# Patient Record
Sex: FEMALE | Race: OTHER | HISPANIC OR LATINO | Employment: UNEMPLOYED | URBAN - NONMETROPOLITAN AREA
[De-identification: names, ages, dates, MRNs, and addresses within clinical notes are randomized per-mention and may not be internally consistent; named-entity substitution may affect disease eponyms.]

---

## 2019-06-19 ENCOUNTER — APPOINTMENT (EMERGENCY)
Dept: CT IMAGING | Facility: HOSPITAL | Age: 29
End: 2019-06-19
Payer: COMMERCIAL

## 2019-06-19 ENCOUNTER — APPOINTMENT (EMERGENCY)
Dept: ULTRASOUND IMAGING | Facility: HOSPITAL | Age: 29
End: 2019-06-19
Payer: COMMERCIAL

## 2019-06-19 ENCOUNTER — HOSPITAL ENCOUNTER (EMERGENCY)
Facility: HOSPITAL | Age: 29
Discharge: HOME | End: 2019-06-19
Attending: EMERGENCY MEDICINE
Payer: COMMERCIAL

## 2019-06-19 VITALS
TEMPERATURE: 99.9 F | SYSTOLIC BLOOD PRESSURE: 90 MMHG | DIASTOLIC BLOOD PRESSURE: 55 MMHG | HEIGHT: 65 IN | HEART RATE: 71 BPM | WEIGHT: 107.14 LBS | RESPIRATION RATE: 12 BRPM | OXYGEN SATURATION: 99 % | BODY MASS INDEX: 17.85 KG/M2

## 2019-06-19 DIAGNOSIS — R10.32 LLQ PAIN: ICD-10-CM

## 2019-06-19 DIAGNOSIS — N73.0 PID (ACUTE PELVIC INFLAMMATORY DISEASE): Primary | ICD-10-CM

## 2019-06-19 LAB
ALBUMIN SERPL BCP-MCNC: 3.9 G/DL (ref 3.5–5)
ALP SERPL-CCNC: 51 U/L (ref 46–116)
ALT SERPL W P-5'-P-CCNC: 20 U/L (ref 12–78)
ANION GAP SERPL CALCULATED.3IONS-SCNC: 5 MMOL/L (ref 4–13)
AST SERPL W P-5'-P-CCNC: 15 U/L (ref 5–45)
BACTERIA UR QL AUTO: ABNORMAL /HPF
BASOPHILS # BLD AUTO: 0.04 THOUSANDS/ΜL (ref 0–0.1)
BASOPHILS NFR BLD AUTO: 1 % (ref 0–1)
BILIRUB SERPL-MCNC: 0.5 MG/DL (ref 0.2–1)
BILIRUB UR QL STRIP: NEGATIVE
BUN SERPL-MCNC: 9 MG/DL (ref 5–25)
CALCIUM SERPL-MCNC: 9.2 MG/DL (ref 8.3–10.1)
CHLORIDE SERPL-SCNC: 104 MMOL/L (ref 100–108)
CLARITY UR: ABNORMAL
CO2 SERPL-SCNC: 29 MMOL/L (ref 21–32)
COLOR UR: YELLOW
CREAT SERPL-MCNC: 0.7 MG/DL (ref 0.6–1.3)
EOSINOPHIL # BLD AUTO: 0.1 THOUSAND/ΜL (ref 0–0.61)
EOSINOPHIL NFR BLD AUTO: 2 % (ref 0–6)
ERYTHROCYTE [DISTWIDTH] IN BLOOD BY AUTOMATED COUNT: 13.5 % (ref 11.6–15.1)
EXT PREG TEST URINE: NORMAL
EXT. CONTROL ED NAV: NORMAL
GFR SERPL CREATININE-BSD FRML MDRD: 119 ML/MIN/1.73SQ M
GLUCOSE SERPL-MCNC: 75 MG/DL (ref 65–140)
GLUCOSE UR STRIP-MCNC: NEGATIVE MG/DL
HCT VFR BLD AUTO: 40.3 % (ref 34.8–46.1)
HGB BLD-MCNC: 12.6 G/DL (ref 11.5–15.4)
HGB UR QL STRIP.AUTO: ABNORMAL
IMM GRANULOCYTES # BLD AUTO: 0.02 THOUSAND/UL (ref 0–0.2)
IMM GRANULOCYTES NFR BLD AUTO: 0 % (ref 0–2)
KETONES UR STRIP-MCNC: NEGATIVE MG/DL
LEUKOCYTE ESTERASE UR QL STRIP: ABNORMAL
LIPASE SERPL-CCNC: 155 U/L (ref 73–393)
LYMPHOCYTES # BLD AUTO: 2.05 THOUSANDS/ΜL (ref 0.6–4.47)
LYMPHOCYTES NFR BLD AUTO: 31 % (ref 14–44)
MCH RBC QN AUTO: 30.1 PG (ref 26.8–34.3)
MCHC RBC AUTO-ENTMCNC: 31.3 G/DL (ref 31.4–37.4)
MCV RBC AUTO: 96 FL (ref 82–98)
MONOCYTES # BLD AUTO: 0.55 THOUSAND/ΜL (ref 0.17–1.22)
MONOCYTES NFR BLD AUTO: 8 % (ref 4–12)
MUCOUS THREADS UR QL AUTO: ABNORMAL
NEUTROPHILS # BLD AUTO: 3.85 THOUSANDS/ΜL (ref 1.85–7.62)
NEUTS SEG NFR BLD AUTO: 58 % (ref 43–75)
NITRITE UR QL STRIP: NEGATIVE
NON-SQ EPI CELLS URNS QL MICRO: ABNORMAL /HPF
NRBC BLD AUTO-RTO: 0 /100 WBCS
PH UR STRIP.AUTO: 5 [PH]
PLATELET # BLD AUTO: 264 THOUSANDS/UL (ref 149–390)
PMV BLD AUTO: 10.8 FL (ref 8.9–12.7)
POTASSIUM SERPL-SCNC: 4.4 MMOL/L (ref 3.5–5.3)
PROT SERPL-MCNC: 7.6 G/DL (ref 6.4–8.2)
PROT UR STRIP-MCNC: NEGATIVE MG/DL
RBC # BLD AUTO: 4.19 MILLION/UL (ref 3.81–5.12)
RBC #/AREA URNS AUTO: ABNORMAL /HPF
SODIUM SERPL-SCNC: 138 MMOL/L (ref 136–145)
SP GR UR STRIP.AUTO: 1.02 (ref 1–1.03)
UROBILINOGEN UR QL STRIP.AUTO: 0.2 E.U./DL
WBC # BLD AUTO: 6.61 THOUSAND/UL (ref 4.31–10.16)
WBC #/AREA URNS AUTO: ABNORMAL /HPF

## 2019-06-19 PROCEDURE — 87491 CHLMYD TRACH DNA AMP PROBE: CPT | Performed by: EMERGENCY MEDICINE

## 2019-06-19 PROCEDURE — 99284 EMERGENCY DEPT VISIT MOD MDM: CPT | Performed by: EMERGENCY MEDICINE

## 2019-06-19 PROCEDURE — 96375 TX/PRO/DX INJ NEW DRUG ADDON: CPT

## 2019-06-19 PROCEDURE — 96361 HYDRATE IV INFUSION ADD-ON: CPT

## 2019-06-19 PROCEDURE — 83690 ASSAY OF LIPASE: CPT | Performed by: EMERGENCY MEDICINE

## 2019-06-19 PROCEDURE — 36415 COLL VENOUS BLD VENIPUNCTURE: CPT | Performed by: EMERGENCY MEDICINE

## 2019-06-19 PROCEDURE — 76830 TRANSVAGINAL US NON-OB: CPT

## 2019-06-19 PROCEDURE — 81025 URINE PREGNANCY TEST: CPT | Performed by: EMERGENCY MEDICINE

## 2019-06-19 PROCEDURE — 96365 THER/PROPH/DIAG IV INF INIT: CPT

## 2019-06-19 PROCEDURE — 74176 CT ABD & PELVIS W/O CONTRAST: CPT

## 2019-06-19 PROCEDURE — 76856 US EXAM PELVIC COMPLETE: CPT

## 2019-06-19 PROCEDURE — 81001 URINALYSIS AUTO W/SCOPE: CPT | Performed by: EMERGENCY MEDICINE

## 2019-06-19 PROCEDURE — 99284 EMERGENCY DEPT VISIT MOD MDM: CPT

## 2019-06-19 PROCEDURE — 80053 COMPREHEN METABOLIC PANEL: CPT | Performed by: EMERGENCY MEDICINE

## 2019-06-19 PROCEDURE — 87591 N.GONORRHOEAE DNA AMP PROB: CPT | Performed by: EMERGENCY MEDICINE

## 2019-06-19 PROCEDURE — 85025 COMPLETE CBC W/AUTO DIFF WBC: CPT | Performed by: EMERGENCY MEDICINE

## 2019-06-19 RX ORDER — KETOROLAC TROMETHAMINE 30 MG/ML
15 INJECTION, SOLUTION INTRAMUSCULAR; INTRAVENOUS ONCE
Status: COMPLETED | OUTPATIENT
Start: 2019-06-19 | End: 2019-06-19

## 2019-06-19 RX ORDER — DOXYCYCLINE 100 MG/1
100 TABLET ORAL 2 TIMES DAILY
Qty: 28 TABLET | Refills: 0 | Status: SHIPPED | OUTPATIENT
Start: 2019-06-19 | End: 2019-07-03

## 2019-06-19 RX ORDER — CITALOPRAM 20 MG/1
20 TABLET ORAL DAILY
COMMUNITY

## 2019-06-19 RX ORDER — DOXYCYCLINE HYCLATE 100 MG/1
100 CAPSULE ORAL ONCE
Status: COMPLETED | OUTPATIENT
Start: 2019-06-19 | End: 2019-06-19

## 2019-06-19 RX ADMIN — SODIUM CHLORIDE 1000 ML: 0.9 INJECTION, SOLUTION INTRAVENOUS at 14:45

## 2019-06-19 RX ADMIN — Medication 250 MG: at 19:51

## 2019-06-19 RX ADMIN — DOXYCYCLINE HYCLATE 100 MG: 100 CAPSULE ORAL at 19:27

## 2019-06-19 RX ADMIN — KETOROLAC TROMETHAMINE 15 MG: 30 INJECTION, SOLUTION INTRAMUSCULAR; INTRAVENOUS at 17:31

## 2019-06-20 LAB
C TRACH DNA SPEC QL NAA+PROBE: NEGATIVE
N GONORRHOEA DNA SPEC QL NAA+PROBE: NEGATIVE

## 2019-10-24 ENCOUNTER — HOSPITAL ENCOUNTER (EMERGENCY)
Facility: HOSPITAL | Age: 29
Discharge: HOME | End: 2019-10-24
Attending: EMERGENCY MEDICINE | Admitting: EMERGENCY MEDICINE
Payer: COMMERCIAL

## 2019-10-24 VITALS
TEMPERATURE: 98.7 F | HEART RATE: 76 BPM | SYSTOLIC BLOOD PRESSURE: 107 MMHG | WEIGHT: 105.82 LBS | BODY MASS INDEX: 17.61 KG/M2 | DIASTOLIC BLOOD PRESSURE: 67 MMHG | OXYGEN SATURATION: 99 % | RESPIRATION RATE: 18 BRPM

## 2019-10-24 DIAGNOSIS — Z32.01 POSITIVE PREGNANCY TEST: Primary | ICD-10-CM

## 2019-10-24 LAB
EXT PREG TEST URINE: POSITIVE
EXT. CONTROL ED NAV: ABNORMAL

## 2019-10-24 PROCEDURE — 99282 EMERGENCY DEPT VISIT SF MDM: CPT

## 2019-10-24 PROCEDURE — 81025 URINE PREGNANCY TEST: CPT

## 2019-10-24 PROCEDURE — 99281 EMR DPT VST MAYX REQ PHY/QHP: CPT | Performed by: PHYSICIAN ASSISTANT

## 2019-10-24 NOTE — ED PROVIDER NOTES
History  Chief Complaint   Patient presents with    Pregnancy Test     Patient had three at home positve pregnancy tests and wants to have a pregnancy test done  Patient denies any symptoms or discomfort  Patient presents to the emergency department today for essentially an known pregnancy  She states her last menstrual cycle was 5 weeks ago  She denies any abdominal pain  She denies nausea vomiting  She denies any vaginal bleeding or discharge  She states she took 3 home pregnancy tests and is here because she needs proof pregnancy for insurance purposes  She is now  5 para 3, last pregnancy was terminated with an elective   Prior to Admission Medications   Prescriptions Last Dose Informant Patient Reported? Taking?   citalopram (CeleXA) 20 mg tablet   Yes No   Sig: Take 20 mg by mouth daily      Facility-Administered Medications: None       Past Medical History:   Diagnosis Date    Bilateral ovarian cysts     Psychiatric disorder     depression       History reviewed  No pertinent surgical history  History reviewed  No pertinent family history  I have reviewed and agree with the history as documented  Social History     Tobacco Use    Smoking status: Current Some Day Smoker     Last attempt to quit: 2016     Years since quitting: 3 3    Smokeless tobacco: Never Used   Substance Use Topics    Alcohol use: Yes     Comment: socially    Drug use: Not Currently        Review of Systems   Constitutional: Negative  Negative for chills and fever  HENT: Negative  Negative for sore throat and trouble swallowing  Eyes: Negative  Respiratory: Negative  Negative for cough, shortness of breath and wheezing  Cardiovascular: Negative  Negative for chest pain and leg swelling  Gastrointestinal: Negative  Negative for abdominal pain, blood in stool and vomiting  Endocrine: Negative  Genitourinary: Negative  Musculoskeletal: Negative    Negative for neck stiffness  Skin: Negative  Allergic/Immunologic: Negative  Neurological: Negative  Negative for dizziness, seizures, speech difficulty, weakness, light-headedness, numbness and headaches  Hematological: Negative  Psychiatric/Behavioral: Negative  All other systems reviewed and are negative  Physical Exam  Physical Exam   Constitutional: She is oriented to person, place, and time  Vital signs are normal  She appears well-developed and well-nourished  She does not have a sickly appearance  She does not appear ill  No distress  HENT:   Right Ear: External ear normal  No swelling  Tympanic membrane is not bulging  Left Ear: External ear normal  No swelling  Tympanic membrane is not bulging  Nose: Nose normal    Mouth/Throat: Oropharynx is clear and moist  No oropharyngeal exudate  Eyes: Pupils are equal, round, and reactive to light  Conjunctivae, EOM and lids are normal    Neck: Normal range of motion  Neck supple  No JVD present  No tracheal deviation, no edema and normal range of motion present  No thyromegaly present  Cardiovascular: Normal rate, regular rhythm, normal heart sounds, intact distal pulses and normal pulses  Exam reveals no gallop and no friction rub  No murmur heard  Pulmonary/Chest: Effort normal and breath sounds normal  No stridor  No respiratory distress  She has no wheezes  She has no rales  She exhibits no tenderness  Abdominal: Soft  Bowel sounds are normal  She exhibits no distension and no mass  There is no tenderness  There is no rebound, no guarding and negative Curran's sign  No hernia  Musculoskeletal: Normal range of motion  She exhibits no edema or tenderness  Lymphadenopathy:     She has no cervical adenopathy  Neurological: She is alert and oriented to person, place, and time  She has normal strength and normal reflexes  No cranial nerve deficit or sensory deficit  GCS eye subscore is 4  GCS verbal subscore is 5  GCS motor subscore is 6  Skin: Skin is warm and dry  Capillary refill takes less than 2 seconds  No rash noted  She is not diaphoretic  No erythema  No pallor  Psychiatric: She has a normal mood and affect  Her speech is normal and behavior is normal    Vitals reviewed  Vital Signs  ED Triage Vitals [10/24/19 1645]   Temperature Pulse Respirations Blood Pressure SpO2   98 7 °F (37 1 °C) 76 18 107/67 99 %      Temp Source Heart Rate Source Patient Position - Orthostatic VS BP Location FiO2 (%)   Temporal Monitor Sitting Left arm --      Pain Score       No Pain           Vitals:    10/24/19 1645   BP: 107/67   Pulse: 76   Patient Position - Orthostatic VS: Sitting         Visual Acuity      ED Medications  Medications - No data to display    Diagnostic Studies  Results Reviewed     Procedure Component Value Units Date/Time    POCT pregnancy, urine [[id number]]  (Abnormal) Resulted:  10/24/19 1651    Lab Status:  Final result Updated:  10/24/19 1651     EXT PREG TEST UR (Ref: Negative) Positive     Control Valid                 No orders to display              Procedures  Procedures       ED Course  ED Course as of Oct 24 1713   u Oct 24, 2019   1658 Control: Valid   1658 PREGNANCY TEST URINE: Positive   1658 Blood Pressure: 107/67   1658 Temperature: 98 7 °F (37 1 °C)   1658 Pulse: 76   1658 Respirations: 18   1658 SpO2: 99 %                               MDM    Disposition  Final diagnoses:   Positive pregnancy test     Time reflects when diagnosis was documented in both MDM as applicable and the Disposition within this note     Time User Action Codes Description Comment    10/24/2019  4:58 PM Pilar ROSE Add [Z32 01] Positive pregnancy test       ED Disposition     ED Disposition Condition Date/Time Comment    Discharge Stable u Oct 24, 2019  4:58 PM Mc Dyer discharge to home/self care              Follow-up Information     Follow up With Specialties Details Why Contact Info    OBGYN  Schedule an appointment as soon as possible for a visit             Patient's Medications   Discharge Prescriptions    No medications on file     No discharge procedures on file      ED Provider  Electronically Signed by           Rodrick Boast, PA-C  10/24/19 100 [facility] Amna Oseguera PA-C  10/24/19 7779

## 2019-10-30 ENCOUNTER — HOSPITAL ENCOUNTER (EMERGENCY)
Facility: HOSPITAL | Age: 29
Discharge: HOME | End: 2019-10-30
Attending: EMERGENCY MEDICINE | Admitting: EMERGENCY MEDICINE
Payer: COMMERCIAL

## 2019-10-30 ENCOUNTER — APPOINTMENT (EMERGENCY)
Dept: ULTRASOUND IMAGING | Facility: HOSPITAL | Age: 29
End: 2019-10-30
Payer: COMMERCIAL

## 2019-10-30 VITALS
SYSTOLIC BLOOD PRESSURE: 99 MMHG | TEMPERATURE: 98 F | RESPIRATION RATE: 16 BRPM | DIASTOLIC BLOOD PRESSURE: 55 MMHG | WEIGHT: 108.03 LBS | OXYGEN SATURATION: 99 % | BODY MASS INDEX: 18.44 KG/M2 | HEART RATE: 86 BPM | HEIGHT: 64 IN

## 2019-10-30 DIAGNOSIS — O20.0 THREATENED MISCARRIAGE IN EARLY PREGNANCY: Primary | ICD-10-CM

## 2019-10-30 DIAGNOSIS — N39.0 LOWER URINARY TRACT INFECTION: ICD-10-CM

## 2019-10-30 DIAGNOSIS — O20.9 VAGINAL BLEEDING IN PREGNANCY, FIRST TRIMESTER: ICD-10-CM

## 2019-10-30 LAB
ABO GROUP BLD: NORMAL
ANION GAP SERPL CALCULATED.3IONS-SCNC: 7 MMOL/L (ref 4–13)
B-HCG SERPL-ACNC: 7637 MIU/ML
BACTERIA UR QL AUTO: ABNORMAL /HPF
BASOPHILS # BLD AUTO: 0.05 THOUSANDS/ΜL (ref 0–0.1)
BASOPHILS NFR BLD AUTO: 1 % (ref 0–1)
BILIRUB UR QL STRIP: NEGATIVE
BLD GP AB SCN SERPL QL: NEGATIVE
BUN SERPL-MCNC: 12 MG/DL (ref 5–25)
CALCIUM SERPL-MCNC: 9 MG/DL (ref 8.3–10.1)
CHLORIDE SERPL-SCNC: 101 MMOL/L (ref 100–108)
CLARITY UR: CLEAR
CO2 SERPL-SCNC: 27 MMOL/L (ref 21–32)
COLOR UR: YELLOW
CREAT SERPL-MCNC: 0.69 MG/DL (ref 0.6–1.3)
EOSINOPHIL # BLD AUTO: 0.08 THOUSAND/ΜL (ref 0–0.61)
EOSINOPHIL NFR BLD AUTO: 1 % (ref 0–6)
ERYTHROCYTE [DISTWIDTH] IN BLOOD BY AUTOMATED COUNT: 13.5 % (ref 11.6–15.1)
GFR SERPL CREATININE-BSD FRML MDRD: 119 ML/MIN/1.73SQ M
GLUCOSE SERPL-MCNC: 75 MG/DL (ref 65–140)
GLUCOSE UR STRIP-MCNC: NEGATIVE MG/DL
HCT VFR BLD AUTO: 39.9 % (ref 34.8–46.1)
HGB BLD-MCNC: 12.6 G/DL (ref 11.5–15.4)
HGB UR QL STRIP.AUTO: ABNORMAL
IMM GRANULOCYTES # BLD AUTO: 0.03 THOUSAND/UL (ref 0–0.2)
IMM GRANULOCYTES NFR BLD AUTO: 0 % (ref 0–2)
KETONES UR STRIP-MCNC: ABNORMAL MG/DL
LEUKOCYTE ESTERASE UR QL STRIP: ABNORMAL
LYMPHOCYTES # BLD AUTO: 2.1 THOUSANDS/ΜL (ref 0.6–4.47)
LYMPHOCYTES NFR BLD AUTO: 24 % (ref 14–44)
MCH RBC QN AUTO: 30 PG (ref 26.8–34.3)
MCHC RBC AUTO-ENTMCNC: 31.6 G/DL (ref 31.4–37.4)
MCV RBC AUTO: 95 FL (ref 82–98)
MONOCYTES # BLD AUTO: 0.51 THOUSAND/ΜL (ref 0.17–1.22)
MONOCYTES NFR BLD AUTO: 6 % (ref 4–12)
NEUTROPHILS # BLD AUTO: 6.11 THOUSANDS/ΜL (ref 1.85–7.62)
NEUTS SEG NFR BLD AUTO: 68 % (ref 43–75)
NITRITE UR QL STRIP: POSITIVE
NON-SQ EPI CELLS URNS QL MICRO: ABNORMAL /HPF
NRBC BLD AUTO-RTO: 0 /100 WBCS
PH UR STRIP.AUTO: 6 [PH]
PLATELET # BLD AUTO: 315 THOUSANDS/UL (ref 149–390)
PMV BLD AUTO: 10.5 FL (ref 8.9–12.7)
POTASSIUM SERPL-SCNC: 3.5 MMOL/L (ref 3.5–5.3)
PROT UR STRIP-MCNC: NEGATIVE MG/DL
RBC # BLD AUTO: 4.2 MILLION/UL (ref 3.81–5.12)
RBC #/AREA URNS AUTO: ABNORMAL /HPF
RH BLD: POSITIVE
SODIUM SERPL-SCNC: 135 MMOL/L (ref 136–145)
SP GR UR STRIP.AUTO: >=1.03 (ref 1–1.03)
SPECIMEN EXPIRATION DATE: NORMAL
UROBILINOGEN UR QL STRIP.AUTO: 0.2 E.U./DL
WBC # BLD AUTO: 8.88 THOUSAND/UL (ref 4.31–10.16)
WBC #/AREA URNS AUTO: ABNORMAL /HPF

## 2019-10-30 PROCEDURE — 84702 CHORIONIC GONADOTROPIN TEST: CPT | Performed by: EMERGENCY MEDICINE

## 2019-10-30 PROCEDURE — 87086 URINE CULTURE/COLONY COUNT: CPT | Performed by: EMERGENCY MEDICINE

## 2019-10-30 PROCEDURE — 99284 EMERGENCY DEPT VISIT MOD MDM: CPT | Performed by: EMERGENCY MEDICINE

## 2019-10-30 PROCEDURE — 87186 SC STD MICRODIL/AGAR DIL: CPT | Performed by: EMERGENCY MEDICINE

## 2019-10-30 PROCEDURE — 36415 COLL VENOUS BLD VENIPUNCTURE: CPT | Performed by: EMERGENCY MEDICINE

## 2019-10-30 PROCEDURE — 99285 EMERGENCY DEPT VISIT HI MDM: CPT

## 2019-10-30 PROCEDURE — 76801 OB US < 14 WKS SINGLE FETUS: CPT

## 2019-10-30 PROCEDURE — 85025 COMPLETE CBC W/AUTO DIFF WBC: CPT | Performed by: EMERGENCY MEDICINE

## 2019-10-30 PROCEDURE — 86850 RBC ANTIBODY SCREEN: CPT | Performed by: EMERGENCY MEDICINE

## 2019-10-30 PROCEDURE — 87077 CULTURE AEROBIC IDENTIFY: CPT | Performed by: EMERGENCY MEDICINE

## 2019-10-30 PROCEDURE — 81001 URINALYSIS AUTO W/SCOPE: CPT | Performed by: EMERGENCY MEDICINE

## 2019-10-30 PROCEDURE — 86901 BLOOD TYPING SEROLOGIC RH(D): CPT | Performed by: EMERGENCY MEDICINE

## 2019-10-30 PROCEDURE — 86900 BLOOD TYPING SEROLOGIC ABO: CPT | Performed by: EMERGENCY MEDICINE

## 2019-10-30 PROCEDURE — 80048 BASIC METABOLIC PNL TOTAL CA: CPT | Performed by: EMERGENCY MEDICINE

## 2019-10-30 RX ORDER — CEPHALEXIN 500 MG/1
500 CAPSULE ORAL EVERY 6 HOURS SCHEDULED
Qty: 21 CAPSULE | Refills: 0 | Status: SHIPPED | OUTPATIENT
Start: 2019-10-30 | End: 2019-11-06

## 2019-10-30 RX ORDER — ACETAMINOPHEN 325 MG/1
975 TABLET ORAL ONCE
Status: COMPLETED | OUTPATIENT
Start: 2019-10-30 | End: 2019-10-30

## 2019-10-30 RX ORDER — CEPHALEXIN 250 MG/1
500 CAPSULE ORAL ONCE
Status: COMPLETED | OUTPATIENT
Start: 2019-10-30 | End: 2019-10-30

## 2019-10-30 RX ADMIN — CEPHALEXIN 500 MG: 250 CAPSULE ORAL at 17:53

## 2019-10-30 RX ADMIN — ACETAMINOPHEN 975 MG: 325 TABLET, FILM COATED ORAL at 17:52

## 2019-10-30 NOTE — ED CARE HANDOFF
Emergency Department Sign Out Note        Sign out and transfer of care from Dr Harsha Wills  See Separate Emergency Department note  The patient, Ignacia Hernandez, was evaluated by the previous provider for dysuria, as well as vaginal spotting in setting of current 1st trimester pregnancy  Last menstrual period is 09/19/2019  Workup Completed:  Basic labs including HCG and type screen which reveal no anemia, essentially unremarkable BMP, hCG of 7637  At this time, patient is awaiting a transabdominal/transvaginal ultrasound to assess for IUP and rule out ectopic pregnancy  ED Course / Workup Pending (followup): I evaluated the patient  Patient is receiving Tylenol for low back pain  I performed a pelvic exam which revealed unremarkable external female genitalia  There is a small amount of nonmalodorous yellow discharge with streaks of red  Cervix is multiparous, closed, with a strand of mucus  Sample for [facility] Chlamydia sent  Patient underwent a transabdominal/transvaginal ultrasound  Ultrasound reveals a possible 6 mm gestational sac within the fundus, however, differential diagnosis still includes ectopic  Recommendation for serial beta-hCGs  Discussed the case with Dr Loretta Rose in [place] who recommends repeat beta HCG in 2 days, repeat ultrasound in 1 week, establishing care with OBGYN for current pregnancy, and discharging the patient with strict return precautions  Patient is Rh positive, no indication for RhoGAM   I discussed lab and imaging results with the patient  Patient received 1st dose of cephalexin in the emergency department  Prescription for cephalexin sent by my colleague Dr Harsha Wills to the patient's preferred pharmacy  Patient provided with contact information for Dr Erickson Gip  She is to return to ER if she develops worsening abdominal pain, significant vaginal bleeding, or she feels as though she is about to pass out    Patient and mother expressed understanding of and agreement with this plan  ED Course as of Oct 30 2006   Wed Oct 30, 2019   1934 Ultrasound       3 Spoke to Dr Juan Carlos Wang in [place] given differential on ultrasound, patient will need a repeat beta Hcg in 2 days and a repeat ultrasound in 1 week  US OB < 14 weeks with transvaginal     US OB < 14 weeks with transvaginal   Final Result   Possible 6 mm gestational sac within the fundal endometrium, too small for measurements  Differential remains early IUP, spontaneous  and ectopic pregnancy  Correlate with serial quantitative BHCG  The study was marked in EPIC for significant notification  Workstation performed: [id number]           Procedures  MDM    Disposition  Final diagnoses:   Threatened miscarriage in early pregnancy   Lower urinary tract infection     Time reflects when diagnosis was documented in both MDM as applicable and the Disposition within this note     Time User Action Codes Description Comment    10/30/2019  5:10 PM Soo Broussard Add [O20 0] Threatened miscarriage in early pregnancy     10/30/2019  5:10 PM Soo Broussard Add [N39 0] Lower urinary tract infection     10/30/2019  5:22 PM Luis Felipe Montero Add [O20 9] Vaginal bleeding in pregnancy, first trimester     10/30/2019  7:47 PM Jaiden Kumar Modify [O20 9] Vaginal bleeding in pregnancy, first trimester       ED Disposition     ED Disposition Condition Date/Time Comment    Discharge Stable Wed Oct 30, 2019  8:15 PM Patsy Schmitt discharge to home/self care              Follow-up Information     Follow up With Specialties Details Why Contact Info    Rena Leiva MD Obstetrics and Gynecology, Gynecology, Obstetrics In 2 days ER Follow up  [facility] [address]  [phone]          Patient's Medications   Discharge Prescriptions    CEPHALEXIN (KEFLEX) 500 MG CAPSULE    Take 1 capsule (500 mg total) by mouth every 6 (six) hours for 7 days       Start Date: 10/30/2019End Date: 2019       Order Dose: 500 mg       Quantity: 21 capsule    Refills: 0     No discharge procedures on file         ED Provider  Electronically Signed by     Rosa Lacey MD  10/31/19 0393

## 2019-10-30 NOTE — DISCHARGE INSTRUCTIONS
Start taking cephalexin (Keflex) for your urinary tract infection  You need a repeat beta hCG (lab draw) in 2 days, on Friday, November 1st   Your beta HCG today 5519 (normal in 1st trimester pregnancy)  See an OBGYN to have this work up  You will need a repeat ultrasound in 1 week to make sure your pregnancy is developing appropriately  While you are waiting to be seen by the physician, if you develop worsening bleeding, lightheadedness, or severe abdominal pain, seek medical attention at the nearest emergency room

## 2019-10-30 NOTE — ED PROVIDER NOTES
History  Chief Complaint   Patient presents with    Vaginal Bleeding - Pregnant     Pt reports vaginal bleeding and pain " in my ovaries" Pt states she is approximately 3 wks pregnant  [id number] woman LMP 19 Sept 2019 with positive home and ED UPT presents with vaginal spotting and passage of small clots at approx 1300 today while at home a/w lower cramping abd pain  Seen in this ED on 24 Oct after 3 positive home UPT and had confirmatory ED UPT; at that time did not have abd pain/gu bleeding  Was discharged home and had no interval complaints until this afternoon  Has not had any additional vaginal bleeding while in ED but has ongoing cramping suprapubic/llq abd pain that is notably improved with urination  Reports urinary frequency without dysuria/urgency over past 4-5d with sx typical of previous UTI according to patient  No GI bleeding; prior elective surgical termination of pregnancy in 2018 which was most recent pregnancy  No abx use in past 30d  Did not take or use anything for sx  Has small amount of clear vaginal discharge without itching/burning also over past 4-5d; no prior hx of STI that patient can recall--screened for GC/chlamydia in June 2019 which were negative  Otherwise no f/c/n/v/abd distention/hematemsis/hemoptysis/epistaxis  No use of assisted reproductive technology in this pregnancy  A/p: Early pregnancy bleeding with possible concurrent urinary/ infectious etiology  CBC/bmp/ua/quantiative hcg/gc+chlamydia via urine  Pelvic US  History provided by:  Patient and medical records      Prior to Admission Medications   Prescriptions Last Dose Informant Patient Reported? Taking?   citalopram (CeleXA) 20 mg tablet   Yes Yes   Sig: Take 20 mg by mouth daily      Facility-Administered Medications: None       Past Medical History:   Diagnosis Date    Bilateral ovarian cysts     Psychiatric disorder     depression       History reviewed  No pertinent surgical history  History reviewed  No pertinent family history  I have reviewed and agree with the history as documented  Social History     Tobacco Use    Smoking status: Former Smoker     Types: Cigarettes     Last attempt to quit: 6/19/2016     Years since quitting: 3 3    Smokeless tobacco: Never Used   Substance Use Topics    Alcohol use: Yes     Comment: socially    Drug use: Not Currently        Review of Systems   Constitutional: Negative for chills and fever  Respiratory: Negative for cough and shortness of breath  Cardiovascular: Negative for chest pain and palpitations  Gastrointestinal: Positive for abdominal pain  Negative for blood in stool, diarrhea, nausea and vomiting  Genitourinary: Positive for frequency, hematuria, pelvic pain, vaginal bleeding and vaginal discharge  Negative for difficulty urinating, flank pain and vaginal pain  Skin: Negative for color change, pallor, rash and wound  Neurological: Negative for weakness and numbness  Hematological: Negative for adenopathy  Does not bruise/bleed easily  All other systems reviewed and are negative  Physical Exam  Physical Exam   Constitutional: She is oriented to person, place, and time  She appears well-developed and well-nourished  She is cooperative  No distress  HENT:   Head: Normocephalic and atraumatic  Right Ear: Hearing and external ear normal    Left Ear: Hearing and external ear normal    Nose: Nose normal    Neck: Trachea normal, normal range of motion and phonation normal  Neck supple  No JVD present  No tracheal tenderness, no spinous process tenderness and no muscular tenderness present  No tracheal deviation present  No thyroid mass and no thyromegaly present  Cardiovascular: Normal rate, regular rhythm, S1 normal, S2 normal, normal heart sounds and intact distal pulses  Exam reveals no gallop and no friction rub  No murmur heard  Pulses:       Radial pulses are 2+ on the right side, and 2+ on the left side          Dorsalis pedis pulses are 2+ on the right side, and 2+ on the left side  Posterior tibial pulses are 2+ on the right side, and 2+ on the left side  Pulmonary/Chest: Effort normal and breath sounds normal  No stridor  No respiratory distress  She has no decreased breath sounds  She has no wheezes  She has no rhonchi  She has no rales  She exhibits no tenderness  Abdominal: Soft  She exhibits no distension and no mass  There is tenderness in the suprapubic area and left lower quadrant  There is no rigidity, no rebound, no guarding and no CVA tenderness  Musculoskeletal: Normal range of motion  She exhibits no edema, tenderness or deformity  Neurological: She is alert and oriented to person, place, and time  GCS eye subscore is 4  GCS verbal subscore is 5  GCS motor subscore is 6  Skin: Skin is warm, dry and intact  No rash noted  She is not diaphoretic  No erythema  Nursing note and vitals reviewed        Vital Signs  ED Triage Vitals [10/30/19 1618]   Temperature Pulse Respirations Blood Pressure SpO2   98 °F (36 7 °C) 84 17 102/51 100 %      Temp Source Heart Rate Source Patient Position - Orthostatic VS BP Location FiO2 (%)   Temporal Monitor Sitting Right arm --      Pain Score       Worst Possible Pain           Vitals:    10/30/19 1618 10/30/19 1915 10/30/19 2000   BP: 102/51 92/57 99/55   Pulse: 84 77 86   Patient Position - Orthostatic VS: Sitting Lying Lying         Visual Acuity      ED Medications  Medications   cephalexin (KEFLEX) capsule 500 mg (500 mg Oral Given 10/30/19 1753)   acetaminophen (TYLENOL) tablet 975 mg (975 mg Oral Given 10/30/19 1752)       Diagnostic Studies  Results Reviewed     Procedure Component Value Units Date/Time    Urine culture [[id number]]  (Abnormal) Collected:  10/30/19 1649    Lab Status:  Preliminary result Specimen:  Urine, Clean Catch Updated:  10/31/19 1806     Urine Culture >100,000 cfu/ml Non lactose fermenting gram negative pablo    Chlamydia/GC amplified DNA by PCR [[id number]] Collected:  10/30/19 1823    Lab Status:   In process Specimen:  Vaginal Updated:  10/30/19 0214    Basic metabolic panel [[id number]]  (Abnormal) Collected:  10/30/19 1651    Lab Status:  Final result Specimen:  Blood from Arm, Right Updated:  10/30/19 1733     Sodium 135 mmol/L      Potassium 3 5 mmol/L      Chloride 101 mmol/L      CO2 27 mmol/L      ANION GAP 7 mmol/L      BUN 12 mg/dL      Creatinine 0 69 mg/dL      Glucose 75 mg/dL      Calcium 9 0 mg/dL      eGFR 119 ml/min/1 73sq m     Narrative:       [place] guidelines for Chronic Kidney Disease (CKD):     Stage 1 with normal or high GFR (GFR > 90 mL/min/1 73 square meters)    Stage 2 Mild CKD (GFR = 60-89 mL/min/1 73 square meters)    Stage 3A Moderate CKD (GFR = 45-59 mL/min/1 73 square meters)    Stage 3B Moderate CKD (GFR = 30-44 mL/min/1 73 square meters)    Stage 4 Severe CKD (GFR = 15-29 mL/min/1 73 square meters)    Stage 5 End Stage CKD (GFR <15 mL/min/1 73 square meters)  Note: GFR calculation is accurate only with a steady state creatinine    hCG, quantitative [[id number]]  (Abnormal) Collected:  10/30/19 1651    Lab Status:  Final result Specimen:  Blood from Arm, Right Updated:  10/30/19 1733     HCG, Quant 9,884 mIU/mL     Narrative:        Expected Ranges:     Approximate               Approximate HCG  Gestation age          Concentration ( mIU/mL)  _____________          ______________________   Juan Luis [facility]                      HCG values  0 2-1                       5-50  1-2                           2-3                         100-5000  3-4                         500-53947  4-5                         1000-52104  5-6                         [id number]  6-8                         18297-213340  8-12                        28944-071728      Urine Microscopic [[id number]]  (Abnormal) Collected:  10/30/19 1649    Lab Status:  Final result Specimen:  Urine, Clean Catch Updated:  10/30/19 2163 RBC, UA 1-2 /hpf      WBC, UA 4-10 /hpf      Epithelial Cells Moderate /hpf      Bacteria, UA Innumerable /hpf      URINE COMMENT --    UA w Reflex to Microscopic w Reflex to Culture [[id number]]  (Abnormal) Collected:  10/30/19 1649    Lab Status:  Final result Specimen:  Urine, Clean Catch Updated:  10/30/19 1705     Color, UA Yellow     Clarity, UA Clear     Specific Gravity, UA >=1 030     pH, UA 6 0     Leukocytes, UA Small     Nitrite, UA Positive     Protein, UA Negative mg/dl      Glucose, UA Negative mg/dl      Ketones, UA Trace mg/dl      Urobilinogen, UA 0 2 E U /dl      Bilirubin, UA Negative     Blood, UA Small     URINE COMMENT --    CBC and differential [[id number]] Collected:  10/30/19 1652    Lab Status:  Final result Specimen:  Blood from Arm, Right Updated:  10/30/19 1657     WBC 8 88 Thousand/uL      RBC 4 20 Million/uL      Hemoglobin 12 6 g/dL      Hematocrit 39 9 %      MCV 95 fL      MCH 30 0 pg      MCHC 31 6 g/dL      RDW 13 5 %      MPV 10 5 fL      Platelets 268 Thousands/uL      nRBC 0 /100 WBCs      Neutrophils Relative 68 %      Immat GRANS % 0 %      Lymphocytes Relative 24 %      Monocytes Relative 6 %      Eosinophils Relative 1 %      Basophils Relative 1 %      Neutrophils Absolute 6 11 Thousands/µL      Immature Grans Absolute 0 03 Thousand/uL      Lymphocytes Absolute 2 10 Thousands/µL      Monocytes Absolute 0 51 Thousand/µL      Eosinophils Absolute 0 08 Thousand/µL      Basophils Absolute 0 05 Thousands/µL                  US OB < 14 weeks with transvaginal   Final Result by Linda Franco MD (10/30 1947)   Possible 6 mm gestational sac within the fundal endometrium, too small for measurements  Differential remains early IUP, spontaneous  and ectopic pregnancy  Correlate with serial quantitative BHCG  The study was marked in EPIC for significant notification              Workstation performed: [id number]                    Procedures  Procedures       ED Course  ED Course as of Oct 31 2104   Wed Oct 30, 2019   1707 WBC wnl  Hg/Hcg wnl  Plt wnl   UA: concentrated sample with positive leukocyte/nitrite and small blood  In setting of patient's sx, this is c/w urinary infection and she will be treated for this in addition to further assessment of threatened miscarriage  MDM  Number of Diagnoses or Management Options  Lower urinary tract infection: new and requires workup  Threatened miscarriage in early pregnancy: new and requires workup     Amount and/or Complexity of Data Reviewed  Clinical lab tests: ordered and reviewed  Tests in the radiology section of CPT®: ordered  Decide to obtain previous medical records or to obtain history from someone other than the patient: yes  Obtain history from someone other than the patient: yes  Review and summarize past medical records: yes  Independent visualization of images, tracings, or specimens: yes    Risk of Complications, Morbidity, and/or Mortality  Presenting problems: high  Diagnostic procedures: high  Management options: moderate  General comments: Care transferred to Dr Eliane Closs at 9660  Patient case discussed including hx/exam features and diagnostic workup  Pending pelvic US results; would treat for UTI in addition to management appropriate to results of pelvic US  Keflex already prescribed  Will need Ob f/u      Patient Progress  Patient progress: stable      Disposition  Final diagnoses:   Threatened miscarriage in early pregnancy   Lower urinary tract infection     Time reflects when diagnosis was documented in both MDM as applicable and the Disposition within this note     Time User Action Codes Description Comment    10/30/2019  5:10 PM Letta Siemens Add [O20 0] Threatened miscarriage in early pregnancy     10/30/2019  5:10 PM Letta Siemens Add [N39 0] Lower urinary tract infection     10/30/2019  5:22 PM Milind Lino Add [O20 9] Vaginal bleeding in pregnancy, first trimester 10/30/2019  7:47 PM Dubose Dinh Modify [O20 9] Vaginal bleeding in pregnancy, first trimester     10/31/2019  1:55 AM Linh Johnny Modify [O20 9] Vaginal bleeding in pregnancy, first trimester       ED Disposition     ED Disposition Condition Date/Time Comment    Discharge Stable Wed Oct 30, 2019  8:15 PM James Ends discharge to home/self care  Follow-up Information     Follow up With Specialties Details Why Naomi Johnson MD Obstetrics and Gynecology, Gynecology, Obstetrics In 2 days ER Follow up 2018 [address], [place] [address]  [phone]            Discharge Medication List as of 10/30/2019  8:22 PM      START taking these medications    Details   cephalexin (KEFLEX) 500 mg capsule Take 1 capsule (500 mg total) by mouth every 6 (six) hours for 7 days, Starting Wed 10/30/2019, Until Wed 11/6/2019, Normal         CONTINUE these medications which have NOT CHANGED    Details   citalopram (CeleXA) 20 mg tablet Take 20 mg by mouth daily, Historical Med           Outpatient Discharge Orders   hCG, quantitative   Standing Status: Future Standing Exp   Date: 11/30/19       ED Provider  Electronically Signed by           Earle Wells DO  10/31/19 4953

## 2019-10-31 ENCOUNTER — PATIENT OUTREACH (OUTPATIENT)
Dept: CASE MANAGEMENT | Facility: OTHER | Age: 29
End: 2019-10-31

## 2019-11-01 LAB — BACTERIA UR CULT: ABNORMAL

## 2020-01-02 ENCOUNTER — HOSPITAL ENCOUNTER (EMERGENCY)
Facility: HOSPITAL | Age: 30
Discharge: HOME | End: 2020-01-02
Attending: EMERGENCY MEDICINE | Admitting: EMERGENCY MEDICINE
Payer: COMMERCIAL

## 2020-01-02 VITALS
OXYGEN SATURATION: 100 % | TEMPERATURE: 98.7 F | WEIGHT: 113.1 LBS | HEIGHT: 65 IN | RESPIRATION RATE: 18 BRPM | HEART RATE: 72 BPM | SYSTOLIC BLOOD PRESSURE: 104 MMHG | DIASTOLIC BLOOD PRESSURE: 67 MMHG | BODY MASS INDEX: 18.84 KG/M2

## 2020-01-02 DIAGNOSIS — Z3A.12 12 WEEKS GESTATION OF PREGNANCY: ICD-10-CM

## 2020-01-02 DIAGNOSIS — G43.909 MIGRAINE: Primary | ICD-10-CM

## 2020-01-02 PROCEDURE — 99283 EMERGENCY DEPT VISIT LOW MDM: CPT

## 2020-01-02 PROCEDURE — 99284 EMERGENCY DEPT VISIT MOD MDM: CPT | Performed by: EMERGENCY MEDICINE

## 2020-01-02 RX ORDER — ACETAMINOPHEN 325 MG/1
650 TABLET ORAL ONCE
Status: COMPLETED | OUTPATIENT
Start: 2020-01-02 | End: 2020-01-02

## 2020-01-02 RX ADMIN — ACETAMINOPHEN 650 MG: 325 TABLET, FILM COATED ORAL at 21:14

## 2020-01-03 NOTE — ED PROVIDER NOTES
History  Chief Complaint   Patient presents with    Headache     pt has headache and neck pain for the past week  pt also has nausea      23-year-old female  3 para 2 , currently pregnant at 3 months gestation with a history of chronic migraines presents complaining of migraine headache  She states this migraine headache is typical for  She states she did not take anything today because she was concerned because she is pregnant  I did have extensive conversation with her explain to her that Tylenol is acceptable during pregnancy and she would not be able to take naproxen or Motrin  Patient voiced understanding  Patient does not have nuchal rigidity  She has no signs or symptoms that her abnormal for her typical migraines  She did not wish to have any further testing  Her headache is in the usual distribution which begins in her neck and wraps around like a hat band to the frontal forehead  Patient refused CAT scan IV fluids or other interventions      History provided by:  Patient  Headache   Quality:  Dull  Radiates to:  Does not radiate  Severity currently:  710  Severity at highest:  7/10  Onset quality:  Gradual  Duration:  1 day  Timing:  Intermittent  Context: activity and bright light    Relieved by:  Resting in a darkened room  Worsened by: Activity  Ineffective treatments:  None tried  Associated symptoms: no abdominal pain, no back pain, no blurred vision, no congestion, no cough, no diarrhea, no dizziness, no drainage and no ear pain    Risk factors: no anger and no family hx of SAH        Prior to Admission Medications   Prescriptions Last Dose Informant Patient Reported? Taking?   citalopram (CeleXA) 20 mg tablet   Yes No   Sig: Take 20 mg by mouth daily      Facility-Administered Medications: None       Past Medical History:   Diagnosis Date    Bilateral ovarian cysts     Psychiatric disorder     depression       History reviewed  No pertinent surgical history      History reviewed  No pertinent family history  I have reviewed and agree with the history as documented  Social History     Tobacco Use    Smoking status: Former Smoker     Types: Cigarettes     Last attempt to quit: 6/19/2016     Years since quitting: 3 5    Smokeless tobacco: Never Used   Substance Use Topics    Alcohol use: Yes     Comment: socially    Drug use: Not Currently        Review of Systems   Constitutional: Negative  HENT: Negative for congestion, ear pain and postnasal drip  Eyes: Negative  Negative for blurred vision  Respiratory: Negative for cough  Gastrointestinal: Negative for abdominal pain and diarrhea  Endocrine: Negative  Genitourinary: Negative  Musculoskeletal: Negative for back pain  Skin: Negative  Allergic/Immunologic: Negative  Neurological: Positive for headaches  Negative for dizziness  Hematological: Negative  Psychiatric/Behavioral: Negative  All other systems reviewed and are negative  Physical Exam  Physical Exam   Constitutional: She appears well-developed and well-nourished  HENT:   Head: Normocephalic  Right Ear: External ear normal    Left Ear: External ear normal    Mouth/Throat: Oropharynx is clear and moist    Eyes: Pupils are equal, round, and reactive to light  Right eye exhibits no discharge  Left eye exhibits no discharge  Neck: Normal range of motion  Neck supple  No JVD present  No tracheal deviation present  No thyromegaly present  No nuchal rigidity   Cardiovascular: Normal rate, regular rhythm and normal heart sounds  Pulmonary/Chest: Effort normal  No stridor  No respiratory distress  She has no wheezes  Abdominal: Soft  She exhibits no distension  Musculoskeletal: Normal range of motion  She exhibits no edema or deformity  Lymphadenopathy:     She has no cervical adenopathy  Neurological: She is alert  She displays normal reflexes  No cranial nerve deficit  Coordination normal    Skin: Skin is warm  Capillary refill takes less than 2 seconds  No erythema  Psychiatric: She has a normal mood and affect  Her behavior is normal    Vitals reviewed  Vital Signs  ED Triage Vitals [01/02/20 1859]   Temperature Pulse Respirations Blood Pressure SpO2   98 7 °F (37 1 °C) 72 18 104/67 100 %      Temp Source Heart Rate Source Patient Position - Orthostatic VS BP Location FiO2 (%)   Temporal Monitor Sitting Right arm --      Pain Score       Worst Possible Pain           Vitals:    01/02/20 1859   BP: 104/67   Pulse: 72   Patient Position - Orthostatic VS: Sitting         Visual Acuity      ED Medications  Medications   acetaminophen (TYLENOL) tablet 650 mg (650 mg Oral Given 1/2/20 2114)       Diagnostic Studies  Results Reviewed     None                 No orders to display              Procedures  Procedures         ED Course                               MDM      Disposition  Final diagnoses:   Migraine   12 weeks gestation of pregnancy     Time reflects when diagnosis was documented in both MDM as applicable and the Disposition within this note     Time User Action Codes Description Comment    1/2/2020  9:11 PM Madeleine Hull Add [G43 909] Migraine     1/2/2020  9:12 PM Madeleine Hull Add [Z3A 12] 12 weeks gestation of pregnancy       ED Disposition     ED Disposition Condition Date/Time Comment    Discharge Stable Thu Jan 2, 2020  9:11 PM Sunday Panning discharge to home/self care  Follow-up Information    None         Discharge Medication List as of 1/2/2020  9:12 PM      CONTINUE these medications which have NOT CHANGED    Details   citalopram (CeleXA) 20 mg tablet Take 20 mg by mouth daily, Historical Med           No discharge procedures on file      ED Provider  Electronically Signed by           Shahana Oakley DO  01/02/20 2113

## 2020-03-19 ENCOUNTER — HOSPITAL ENCOUNTER (EMERGENCY)
Facility: HOSPITAL | Age: 30
Discharge: HOME | End: 2020-03-19
Attending: EMERGENCY MEDICINE
Payer: COMMERCIAL

## 2020-03-19 ENCOUNTER — APPOINTMENT (EMERGENCY)
Dept: RADIOLOGY | Facility: HOSPITAL | Age: 30
End: 2020-03-19
Payer: COMMERCIAL

## 2020-03-19 VITALS
TEMPERATURE: 100.3 F | OXYGEN SATURATION: 98 % | SYSTOLIC BLOOD PRESSURE: 92 MMHG | DIASTOLIC BLOOD PRESSURE: 58 MMHG | HEART RATE: 81 BPM | RESPIRATION RATE: 18 BRPM

## 2020-03-19 DIAGNOSIS — K04.7 DENTAL INFECTION: Primary | ICD-10-CM

## 2020-03-19 DIAGNOSIS — J06.9 VIRAL URI WITH COUGH: ICD-10-CM

## 2020-03-19 LAB
ALBUMIN SERPL BCP-MCNC: 2.5 G/DL (ref 3.5–5)
ALP SERPL-CCNC: 75 U/L (ref 46–116)
ALT SERPL W P-5'-P-CCNC: 26 U/L (ref 12–78)
ANION GAP SERPL CALCULATED.3IONS-SCNC: 9 MMOL/L (ref 4–13)
AST SERPL W P-5'-P-CCNC: 13 U/L (ref 5–45)
BASOPHILS # BLD AUTO: 0.03 THOUSANDS/ΜL (ref 0–0.1)
BASOPHILS NFR BLD AUTO: 0 % (ref 0–1)
BILIRUB DIRECT SERPL-MCNC: 0.09 MG/DL (ref 0–0.2)
BILIRUB SERPL-MCNC: 0.3 MG/DL (ref 0.2–1)
BUN SERPL-MCNC: 3 MG/DL (ref 5–25)
CALCIUM SERPL-MCNC: 8.5 MG/DL (ref 8.3–10.1)
CHLORIDE SERPL-SCNC: 104 MMOL/L (ref 100–108)
CO2 SERPL-SCNC: 23 MMOL/L (ref 21–32)
CREAT SERPL-MCNC: 0.41 MG/DL (ref 0.6–1.3)
EOSINOPHIL # BLD AUTO: 0.04 THOUSAND/ΜL (ref 0–0.61)
EOSINOPHIL NFR BLD AUTO: 0 % (ref 0–6)
ERYTHROCYTE [DISTWIDTH] IN BLOOD BY AUTOMATED COUNT: 13.4 % (ref 11.6–15.1)
FLUAV RNA NPH QL NAA+PROBE: NORMAL
FLUBV RNA NPH QL NAA+PROBE: NORMAL
GFR SERPL CREATININE-BSD FRML MDRD: 141 ML/MIN/1.73SQ M
GLUCOSE SERPL-MCNC: 86 MG/DL (ref 65–140)
HCT VFR BLD AUTO: 28.6 % (ref 34.8–46.1)
HGB BLD-MCNC: 9.3 G/DL (ref 11.5–15.4)
IMM GRANULOCYTES # BLD AUTO: 0.07 THOUSAND/UL (ref 0–0.2)
IMM GRANULOCYTES NFR BLD AUTO: 1 % (ref 0–2)
LIPASE SERPL-CCNC: 87 U/L (ref 73–393)
LYMPHOCYTES # BLD AUTO: 1.36 THOUSANDS/ΜL (ref 0.6–4.47)
LYMPHOCYTES NFR BLD AUTO: 12 % (ref 14–44)
MCH RBC QN AUTO: 31.2 PG (ref 26.8–34.3)
MCHC RBC AUTO-ENTMCNC: 32.5 G/DL (ref 31.4–37.4)
MCV RBC AUTO: 96 FL (ref 82–98)
MONOCYTES # BLD AUTO: 1.27 THOUSAND/ΜL (ref 0.17–1.22)
MONOCYTES NFR BLD AUTO: 11 % (ref 4–12)
NEUTROPHILS # BLD AUTO: 8.69 THOUSANDS/ΜL (ref 1.85–7.62)
NEUTS SEG NFR BLD AUTO: 76 % (ref 43–75)
NRBC BLD AUTO-RTO: 0 /100 WBCS
PLATELET # BLD AUTO: 244 THOUSANDS/UL (ref 149–390)
PMV BLD AUTO: 10.5 FL (ref 8.9–12.7)
POTASSIUM SERPL-SCNC: 3.7 MMOL/L (ref 3.5–5.3)
PROT SERPL-MCNC: 6.7 G/DL (ref 6.4–8.2)
RBC # BLD AUTO: 2.98 MILLION/UL (ref 3.81–5.12)
RSV RNA NPH QL NAA+PROBE: NORMAL
S PYO DNA THROAT QL NAA+PROBE: NORMAL
SODIUM SERPL-SCNC: 136 MMOL/L (ref 136–145)
WBC # BLD AUTO: 11.46 THOUSAND/UL (ref 4.31–10.16)

## 2020-03-19 PROCEDURE — 87651 STREP A DNA AMP PROBE: CPT | Performed by: EMERGENCY MEDICINE

## 2020-03-19 PROCEDURE — 96375 TX/PRO/DX INJ NEW DRUG ADDON: CPT

## 2020-03-19 PROCEDURE — 96365 THER/PROPH/DIAG IV INF INIT: CPT

## 2020-03-19 PROCEDURE — 87631 RESP VIRUS 3-5 TARGETS: CPT | Performed by: EMERGENCY MEDICINE

## 2020-03-19 PROCEDURE — 85025 COMPLETE CBC W/AUTO DIFF WBC: CPT | Performed by: EMERGENCY MEDICINE

## 2020-03-19 PROCEDURE — 80076 HEPATIC FUNCTION PANEL: CPT | Performed by: EMERGENCY MEDICINE

## 2020-03-19 PROCEDURE — 99285 EMERGENCY DEPT VISIT HI MDM: CPT | Performed by: EMERGENCY MEDICINE

## 2020-03-19 PROCEDURE — 80048 BASIC METABOLIC PNL TOTAL CA: CPT | Performed by: EMERGENCY MEDICINE

## 2020-03-19 PROCEDURE — 99284 EMERGENCY DEPT VISIT MOD MDM: CPT

## 2020-03-19 PROCEDURE — 36415 COLL VENOUS BLD VENIPUNCTURE: CPT | Performed by: EMERGENCY MEDICINE

## 2020-03-19 PROCEDURE — 83690 ASSAY OF LIPASE: CPT | Performed by: EMERGENCY MEDICINE

## 2020-03-19 PROCEDURE — 71045 X-RAY EXAM CHEST 1 VIEW: CPT

## 2020-03-19 RX ORDER — METOCLOPRAMIDE 10 MG/1
10 TABLET ORAL EVERY 6 HOURS PRN
Qty: 12 TABLET | Refills: 0 | Status: SHIPPED | OUTPATIENT
Start: 2020-03-19

## 2020-03-19 RX ORDER — AMOXICILLIN 250 MG/1
500 CAPSULE ORAL ONCE
Status: COMPLETED | OUTPATIENT
Start: 2020-03-19 | End: 2020-03-19

## 2020-03-19 RX ORDER — METOCLOPRAMIDE HYDROCHLORIDE 5 MG/ML
10 INJECTION INTRAMUSCULAR; INTRAVENOUS ONCE
Status: COMPLETED | OUTPATIENT
Start: 2020-03-19 | End: 2020-03-19

## 2020-03-19 RX ORDER — AMOXICILLIN 500 MG/1
500 CAPSULE ORAL 3 TIMES DAILY
Qty: 21 CAPSULE | Refills: 0 | Status: SHIPPED | OUTPATIENT
Start: 2020-03-19 | End: 2020-03-26

## 2020-03-19 RX ADMIN — SODIUM CHLORIDE, SODIUM LACTATE, POTASSIUM CHLORIDE, AND CALCIUM CHLORIDE 1000 ML: .6; .31; .03; .02 INJECTION, SOLUTION INTRAVENOUS at 15:59

## 2020-03-19 RX ADMIN — METOCLOPRAMIDE HYDROCHLORIDE 10 MG: 5 INJECTION INTRAMUSCULAR; INTRAVENOUS at 16:00

## 2020-03-19 RX ADMIN — AMOXICILLIN 500 MG: 250 CAPSULE ORAL at 17:12

## 2020-03-19 NOTE — DISCHARGE INSTRUCTIONS
You have a dental infection in your right upper wisdom  You have been prescribed amoxicillin, an antibiotic, for this infection; you can also take Tylenol (acetaminophen) for pain control according to package instructions  You also appear to have a viral respiratory infection  This may be caused by any one of a number of viruses that cause sore throat, fever, cough, and tiredness  Most of these viruses in otherwise healthy people are not serious and do not require any specific treatment apart from symptomatic treatment (for cough, fever, etc )  You should stay away from the people in your household until you are feeling better  Spend your time in a room or area away from other people as much as possible  When you are interacting with other people in your home, you should wear a surgical mask

## 2020-03-19 NOTE — ED PROVIDER NOTES
History  Chief Complaint   Patient presents with    Pain With Breathing     pt has had fevers on and off since last night but also not feeling well  pt also developed chest discomfort  last dose of Tylenol about an hour ago  24 weeks pregnant  traveled to [place] a month ago     Subjective fever since last night+chills+np cough+odynophagia  This morning became nauseated and had 2 episodes nb/nb emesis--no ongoing nausea since then  Reports pain across the chest and upper back with deep breathing  Cough less concerning to patient than chest discomfort with breathing and generalized malaise  Has taken acetaminophen for sx with last dose 60 min pta  Also notes right-sided otalgia since yesterday that she attributes to the right upper wisdom tooth being partially misaligned and decayed, which itself is also painful  Does not have any underlying pulmonary disease  Otherwise in normal state of health until onset of sx  Travelled to [place] area within past 4 wk; did not travel to areas of widespread Covid-19 transmission within past 4 wk  No definitive sick contacts (of people with Covid-19 or other respiratory illness) within past month   24 wk gestation; [facility] in [place]  Did not receive influenza vaccine within this season      History provided by:  Patient, spouse and medical records      Prior to Admission Medications   Prescriptions Last Dose Informant Patient Reported? Taking?   citalopram (CeleXA) 20 mg tablet   Yes No   Sig: Take 20 mg by mouth daily      Facility-Administered Medications: None       Past Medical History:   Diagnosis Date    Bilateral ovarian cysts     Psychiatric disorder     depression       No past surgical history on file  No family history on file  I have reviewed and agree with the history as documented      E-Cigarette/Vaping    E-Cigarette Use Never User      E-Cigarette/Vaping Substances     Social History     Tobacco Use    Smoking status: Former Smoker     Types: Cigarettes     Last attempt to quit: 6/19/2016     Years since quitting: 3 7    Smokeless tobacco: Never Used   Substance Use Topics    Alcohol use: Yes     Comment: socially    Drug use: Not Currently       Review of Systems   Constitutional: Positive for chills, fatigue and fever  HENT: Positive for dental problem and sore throat  Negative for congestion and rhinorrhea  Respiratory: Positive for cough and shortness of breath  Cardiovascular: Negative for chest pain and palpitations  Gastrointestinal: Positive for nausea  Negative for abdominal pain, diarrhea and vomiting  Skin: Negative for color change, pallor, rash and wound  Neurological: Positive for headaches  Negative for dizziness, weakness, light-headedness and numbness  Hematological: Negative for adenopathy  Does not bruise/bleed easily  All other systems reviewed and are negative  Physical Exam  Physical Exam   Constitutional: She is oriented to person, place, and time  She appears well-developed and well-nourished  She is active and cooperative  She appears distressed (mild painful distress)  HENT:   Head: Normocephalic and atraumatic  Right Ear: Hearing, tympanic membrane, external ear and ear canal normal    Left Ear: Hearing, tympanic membrane, external ear and ear canal normal    Nose: Nose normal    Mouth/Throat: Uvula is midline  Abnormal dentition  Posterior oropharyngeal erythema (uvular erythema with posterior oropharyngeal erythema; no exudate present in oropharynx) present  Tonsils are 1+ on the right  Tonsils are 1+ on the left  No tonsillar exudate  Tooth #1 partially decayed on on occlusal surface  No visible gingival swelling or abscess   Neck: Trachea normal and phonation normal  No tracheal tenderness present  No tracheal deviation present  Cardiovascular: Normal rate, regular rhythm, S1 normal, S2 normal, normal heart sounds and intact distal pulses   Exam reveals no gallop and no friction rub  No murmur heard  Pulses:       Radial pulses are 2+ on the right side, and 2+ on the left side  Dorsalis pedis pulses are 2+ on the right side, and 2+ on the left side  Posterior tibial pulses are 2+ on the right side, and 2+ on the left side  Pulmonary/Chest: Effort normal and breath sounds normal  No stridor  No respiratory distress  She has no decreased breath sounds  She has no wheezes  She has no rhonchi  She has no rales  She exhibits no tenderness  Abdominal: Soft  She exhibits no distension (gravid; fundus palpable approx 2cm above umbilicus) and no mass  There is no tenderness  There is no rigidity, no rebound, no guarding and no CVA tenderness  Musculoskeletal: Normal range of motion  She exhibits no edema, tenderness or deformity  Neurological: She is alert and oriented to person, place, and time  She has normal strength  No cranial nerve deficit or sensory deficit  She exhibits normal muscle tone  GCS eye subscore is 4  GCS verbal subscore is 5  GCS motor subscore is 6  PERRLA; EOMI  Sensation intact to light touch over face in V1-V3 distribution bilaterally  Facial expressions symmetric  Tongue/uvula midline  Shoulder shrug equal bilaterally  Strength 5/5 in UE/LE bilaterally  Sensation intact to light touch in UE/LE bilaterally  Skin: Skin is warm, dry and intact  Nursing note and vitals reviewed        Vital Signs  ED Triage Vitals [03/19/20 1434]   Temperature Pulse Respirations Blood Pressure SpO2   100 3 °F (37 9 °C) 88 18 91/51 98 %      Temp Source Heart Rate Source Patient Position - Orthostatic VS BP Location FiO2 (%)   Temporal Monitor Lying Left arm --      Pain Score       7           Vitals:    03/19/20 1434 03/19/20 1600 03/19/20 1700   BP: 91/51 92/54 92/58   Pulse: 88 90 81   Patient Position - Orthostatic VS: Lying Lying Lying         Visual Acuity      ED Medications  Medications   metoclopramide (REGLAN) injection 10 mg (10 mg Intravenous Given 3/19/20 1600)   lactated ringers bolus 1,000 mL (0 mL Intravenous Stopped 3/19/20 1713)   amoxicillin (AMOXIL) capsule 500 mg (500 mg Oral Given 3/19/20 1712)       Diagnostic Studies  Results Reviewed     Procedure Component Value Units Date/Time    Influenza A/B and RSV PCR [[id number]]  (Normal) Collected:  03/19/20 1600    Lab Status:  Final result Specimen:  Nasopharyngeal from Nose Updated:  03/19/20 1649     INFLUENZA A PCR None Detected     INFLUENZA B PCR None Detected     RSV PCR None Detected    Strep A PCR [[id number]]  (Normal) Collected:  03/19/20 1600    Lab Status:  Final result Specimen:  Throat Updated:  03/19/20 1645     STREP A PCR None Detected    Basic metabolic panel [[id number]]  (Abnormal) Collected:  03/19/20 1600    Lab Status:  Final result Specimen:  Blood from Arm, Right Updated:  03/19/20 1637     Sodium 136 mmol/L      Potassium 3 7 mmol/L      Chloride 104 mmol/L      CO2 23 mmol/L      ANION GAP 9 mmol/L      BUN 3 mg/dL      Creatinine 0 41 mg/dL      Glucose 86 mg/dL      Calcium 8 5 mg/dL      eGFR 141 ml/min/1 73sq m     Narrative:       [place] guidelines for Chronic Kidney Disease (CKD):     Stage 1 with normal or high GFR (GFR > 90 mL/min/1 73 square meters)    Stage 2 Mild CKD (GFR = 60-89 mL/min/1 73 square meters)    Stage 3A Moderate CKD (GFR = 45-59 mL/min/1 73 square meters)    Stage 3B Moderate CKD (GFR = 30-44 mL/min/1 73 square meters)    Stage 4 Severe CKD (GFR = 15-29 mL/min/1 73 square meters)    Stage 5 End Stage CKD (GFR <15 mL/min/1 73 square meters)  Note: GFR calculation is accurate only with a steady state creatinine    Lipase [[id number]]  (Normal) Collected:  03/19/20 1600    Lab Status:  Final result Specimen:  Blood from Arm, Right Updated:  03/19/20 1637     Lipase 87 u/L     Hepatic function panel [[id number]]  (Abnormal) Collected:  03/19/20 1600    Lab Status:  Final result Specimen:  Blood from Arm, Right Updated:  03/19/20 1637     Total Bilirubin 0 30 mg/dL      Bilirubin, Direct 0 09 mg/dL      Alkaline Phosphatase 75 U/L      AST 13 U/L      ALT 26 U/L      Total Protein 6 7 g/dL      Albumin 2 5 g/dL     CBC and differential [[id number]]  (Abnormal) Collected:  03/19/20 1600    Lab Status:  Final result Specimen:  Blood from Arm, Right Updated:  03/19/20 1615     WBC 11 46 Thousand/uL      RBC 2 98 Million/uL      Hemoglobin 9 3 g/dL      Hematocrit 28 6 %      MCV 96 fL      MCH 31 2 pg      MCHC 32 5 g/dL      RDW 13 4 %      MPV 10 5 fL      Platelets 656 Thousands/uL      nRBC 0 /100 WBCs      Neutrophils Relative 76 %      Immat GRANS % 1 %      Lymphocytes Relative 12 %      Monocytes Relative 11 %      Eosinophils Relative 0 %      Basophils Relative 0 %      Neutrophils Absolute 8 69 Thousands/µL      Immature Grans Absolute 0 07 Thousand/uL      Lymphocytes Absolute 1 36 Thousands/µL      Monocytes Absolute 1 27 Thousand/µL      Eosinophils Absolute 0 04 Thousand/µL      Basophils Absolute 0 03 Thousands/µL                  XR chest 1 view portable   ED Interpretation by Macho Lim DO (03/19 1606)   Airway is midline  Lungs are clear bilaterally with no evidence of pulmonary vascular congestion/focal infiltrate/pleural effusion//pneumothorax  Cardiac and mediastinal silhouettes are within normal limits  Osseous structures appear normal       Final Result by Mitch Hall MD (03/19 1626)      No acute cardiopulmonary disease              Workstation performed: [id number]                    Procedures  Procedures         ED Course  ED Course as of Mar 19 1805   Thu Mar 19, 2020   1515 Review of outpatient BP measurements from OB appts shows baseline BP in the 90s-100s/50s-60s      1621 WBC elevated c/w infectious etiology  Anemia new from prior: at least partially dilutional d/t pregnancy  Plt wnl      1650 Influenza A/B negative  RSV negative  GAS Negative  Electrolytes wnl  Transaminases wnl  New hypoalbuminemia compared to prior            MDM  Number of Diagnoses or Management Options  Dental infection of tooth #1: new and does not require workup  Viral URI with cough: new and does not require workup     Amount and/or Complexity of Data Reviewed  Clinical lab tests: reviewed and ordered  Tests in the radiology section of CPT®: ordered and reviewed  Decide to obtain previous medical records or to obtain history from someone other than the patient: yes  Obtain history from someone other than the patient: yes  Review and summarize past medical records: yes  Independent visualization of images, tracings, or specimens: yes    Risk of Complications, Morbidity, and/or Mortality  Presenting problems: high  Diagnostic procedures: high  Management options: high  General comments: 5379:  Patient is symptomatically improved with decreased headache; no additional episodes of vomiting while in the emergency department  Reviewed results of diagnostic workup with the patient  She does not have any evidence of pneumonia or end-organ dysfunction; has likely viral LRTI and concurrent dental infection of tooth #1 without evidence of deep space infection of head/neck  Has no respiratory compromise with her current sx  Will Rx amoxicillin for dental infection and metoclopramide for vomiting/nausea  Discussed self-isolation at home until sx resolve and the need to wear a mask for contact with anyone inside the home  She is able to isolate herself within her home from contact with anyone else  Has Ob f/u in early April: this is reasonable unless her sx markedly worsen--in that case should call pmd/ob to determine their preference for office f/u vs returning to ED  All questions answered prior to discharge        Patient Progress  Patient progress: improved        Disposition  Final diagnoses:   Dental infection of tooth #1   Viral URI with cough     Time reflects when diagnosis was documented in both MDM as applicable and the Disposition within this note     Time User Action Codes Description Comment    3/19/2020  5:04 PM Merryl Banana Add [K04 7] Dental infection     3/19/2020  5:04 PM Merryl Banana Modify [K04 7] Dental infection of tooth #1     3/19/2020  5:04 PM Merryl Banana Add [J06 9,  B97 89] Viral URI with cough       ED Disposition     ED Disposition Condition Date/Time Comment    Discharge Stable Thu Mar 19, 2020  5:04 PM [facility] discharge to home/self care  Follow-up Information     Follow up With Specialties Details Why Contact Info    Your OB doctor   Go to your appointment in April as scheduled  If you get sicker in the meantime, please call your regular doctor or your Ob doctor about follow-up  They may wish to see you in their office or direct you to the ER  Discharge Medication List as of 3/19/2020  5:09 PM      START taking these medications    Details   amoxicillin (AMOXIL) 500 mg capsule Take 1 capsule (500 mg total) by mouth 3 (three) times a day for 7 days, Starting Thu 3/19/2020, Until Thu 3/26/2020, Normal      metoclopramide (REGLAN) 10 mg tablet Take 1 tablet (10 mg total) by mouth every 6 (six) hours as needed (nausea/vomiting), Starting Thu 3/19/2020, Normal         CONTINUE these medications which have NOT CHANGED    Details   citalopram (CeleXA) 20 mg tablet Take 20 mg by mouth daily, Historical Med           No discharge procedures on file      PDMP Review     None          ED Provider  Electronically Signed by           Greg Rasmussen DO  03/19/20 1277

## 2022-02-15 ENCOUNTER — HOSPITAL ENCOUNTER (EMERGENCY)
Facility: HOSPITAL | Age: 32
Discharge: HOME | End: 2022-02-16
Attending: EMERGENCY MEDICINE | Admitting: EMERGENCY MEDICINE
Payer: COMMERCIAL

## 2022-02-15 ENCOUNTER — APPOINTMENT (EMERGENCY)
Dept: ULTRASOUND IMAGING | Facility: HOSPITAL | Age: 32
End: 2022-02-15
Payer: COMMERCIAL

## 2022-02-15 DIAGNOSIS — N83.209 OVARIAN CYST AFFECTING PREGNANCY IN SECOND TRIMESTER, ANTEPARTUM: ICD-10-CM

## 2022-02-15 DIAGNOSIS — R10.9 ABDOMINAL PAIN, UNSPECIFIED ABDOMINAL LOCATION: Primary | ICD-10-CM

## 2022-02-15 DIAGNOSIS — O34.82 OVARIAN CYST AFFECTING PREGNANCY IN SECOND TRIMESTER, ANTEPARTUM: ICD-10-CM

## 2022-02-15 LAB
ALBUMIN SERPL BCP-MCNC: 2.8 G/DL (ref 3.5–5)
ALP SERPL-CCNC: 53 U/L (ref 46–116)
ALT SERPL W P-5'-P-CCNC: 12 U/L (ref 12–78)
ANION GAP SERPL CALCULATED.3IONS-SCNC: 9 MMOL/L (ref 4–13)
AST SERPL W P-5'-P-CCNC: 13 U/L (ref 5–45)
B-HCG SERPL-ACNC: ABNORMAL MIU/ML
BACTERIA UR QL AUTO: ABNORMAL /HPF
BASOPHILS # BLD AUTO: 0.02 THOUSANDS/ΜL (ref 0–0.1)
BASOPHILS NFR BLD AUTO: 0 % (ref 0–1)
BILIRUB SERPL-MCNC: 0.22 MG/DL (ref 0.2–1)
BILIRUB UR QL STRIP: NEGATIVE
BUN SERPL-MCNC: 6 MG/DL (ref 5–25)
CALCIUM ALBUM COR SERPL-MCNC: 9.6 MG/DL (ref 8.3–10.1)
CALCIUM SERPL-MCNC: 8.6 MG/DL (ref 8.3–10.1)
CHLORIDE SERPL-SCNC: 104 MMOL/L (ref 100–108)
CLARITY UR: ABNORMAL
CO2 SERPL-SCNC: 25 MMOL/L (ref 21–32)
COLOR UR: YELLOW
CREAT SERPL-MCNC: 0.5 MG/DL (ref 0.6–1.3)
EOSINOPHIL # BLD AUTO: 0.04 THOUSAND/ΜL (ref 0–0.61)
EOSINOPHIL NFR BLD AUTO: 1 % (ref 0–6)
ERYTHROCYTE [DISTWIDTH] IN BLOOD BY AUTOMATED COUNT: 13.7 % (ref 11.6–15.1)
GFR SERPL CREATININE-BSD FRML MDRD: 130 ML/MIN/1.73SQ M
GLUCOSE SERPL-MCNC: 117 MG/DL (ref 65–140)
GLUCOSE UR STRIP-MCNC: NEGATIVE MG/DL
HCT VFR BLD AUTO: 34.1 % (ref 34.8–46.1)
HGB BLD-MCNC: 11.4 G/DL (ref 11.5–15.4)
HGB UR QL STRIP.AUTO: NEGATIVE
IMM GRANULOCYTES # BLD AUTO: 0.02 THOUSAND/UL (ref 0–0.2)
IMM GRANULOCYTES NFR BLD AUTO: 0 % (ref 0–2)
KETONES UR STRIP-MCNC: NEGATIVE MG/DL
LACTATE SERPL-SCNC: 1.2 MMOL/L (ref 0.5–2)
LEUKOCYTE ESTERASE UR QL STRIP: ABNORMAL
LYMPHOCYTES # BLD AUTO: 0.88 THOUSANDS/ΜL (ref 0.6–4.47)
LYMPHOCYTES NFR BLD AUTO: 13 % (ref 14–44)
MCH RBC QN AUTO: 31.8 PG (ref 26.8–34.3)
MCHC RBC AUTO-ENTMCNC: 33.4 G/DL (ref 31.4–37.4)
MCV RBC AUTO: 95 FL (ref 82–98)
MONOCYTES # BLD AUTO: 0.54 THOUSAND/ΜL (ref 0.17–1.22)
MONOCYTES NFR BLD AUTO: 8 % (ref 4–12)
NEUTROPHILS # BLD AUTO: 5.53 THOUSANDS/ΜL (ref 1.85–7.62)
NEUTS SEG NFR BLD AUTO: 78 % (ref 43–75)
NITRITE UR QL STRIP: NEGATIVE
NON-SQ EPI CELLS URNS QL MICRO: ABNORMAL /HPF
NRBC BLD AUTO-RTO: 0 /100 WBCS
PH UR STRIP.AUTO: 6.5 [PH]
PLATELET # BLD AUTO: 228 THOUSANDS/UL (ref 149–390)
PMV BLD AUTO: 11.4 FL (ref 8.9–12.7)
POTASSIUM SERPL-SCNC: 3.4 MMOL/L (ref 3.5–5.3)
PROT SERPL-MCNC: 6.9 G/DL (ref 6.4–8.2)
PROT UR STRIP-MCNC: NEGATIVE MG/DL
RBC # BLD AUTO: 3.58 MILLION/UL (ref 3.81–5.12)
RBC #/AREA URNS AUTO: ABNORMAL /HPF
SODIUM SERPL-SCNC: 138 MMOL/L (ref 136–145)
SP GR UR STRIP.AUTO: 1.02 (ref 1–1.03)
UROBILINOGEN UR QL STRIP.AUTO: 1 E.U./DL
WBC # BLD AUTO: 7.03 THOUSAND/UL (ref 4.31–10.16)
WBC #/AREA URNS AUTO: ABNORMAL /HPF

## 2022-02-15 PROCEDURE — 96374 THER/PROPH/DIAG INJ IV PUSH: CPT

## 2022-02-15 PROCEDURE — 85025 COMPLETE CBC W/AUTO DIFF WBC: CPT | Performed by: EMERGENCY MEDICINE

## 2022-02-15 PROCEDURE — 87186 SC STD MICRODIL/AGAR DIL: CPT | Performed by: EMERGENCY MEDICINE

## 2022-02-15 PROCEDURE — 76815 OB US LIMITED FETUS(S): CPT

## 2022-02-15 PROCEDURE — 96361 HYDRATE IV INFUSION ADD-ON: CPT

## 2022-02-15 PROCEDURE — 99285 EMERGENCY DEPT VISIT HI MDM: CPT | Performed by: EMERGENCY MEDICINE

## 2022-02-15 PROCEDURE — 83605 ASSAY OF LACTIC ACID: CPT | Performed by: EMERGENCY MEDICINE

## 2022-02-15 PROCEDURE — 81001 URINALYSIS AUTO W/SCOPE: CPT | Performed by: EMERGENCY MEDICINE

## 2022-02-15 PROCEDURE — 36415 COLL VENOUS BLD VENIPUNCTURE: CPT | Performed by: EMERGENCY MEDICINE

## 2022-02-15 PROCEDURE — 87077 CULTURE AEROBIC IDENTIFY: CPT | Performed by: EMERGENCY MEDICINE

## 2022-02-15 PROCEDURE — 99284 EMERGENCY DEPT VISIT MOD MDM: CPT

## 2022-02-15 PROCEDURE — 84702 CHORIONIC GONADOTROPIN TEST: CPT | Performed by: EMERGENCY MEDICINE

## 2022-02-15 PROCEDURE — 80053 COMPREHEN METABOLIC PANEL: CPT | Performed by: EMERGENCY MEDICINE

## 2022-02-15 PROCEDURE — 87086 URINE CULTURE/COLONY COUNT: CPT | Performed by: EMERGENCY MEDICINE

## 2022-02-15 RX ORDER — ONDANSETRON 2 MG/ML
4 INJECTION INTRAMUSCULAR; INTRAVENOUS ONCE
Status: COMPLETED | OUTPATIENT
Start: 2022-02-15 | End: 2022-02-15

## 2022-02-15 RX ORDER — ACETAMINOPHEN 325 MG/1
650 TABLET ORAL ONCE
Status: COMPLETED | OUTPATIENT
Start: 2022-02-15 | End: 2022-02-15

## 2022-02-15 RX ADMIN — SODIUM CHLORIDE 1000 ML: 0.9 INJECTION, SOLUTION INTRAVENOUS at 20:48

## 2022-02-15 RX ADMIN — ONDANSETRON 4 MG: 2 INJECTION INTRAMUSCULAR; INTRAVENOUS at 20:51

## 2022-02-15 RX ADMIN — ACETAMINOPHEN 650 MG: 325 TABLET, FILM COATED ORAL at 20:40

## 2022-02-15 NOTE — Clinical Note
Akil Hinton was seen and treated in our emergency department on 2/15/2022  Diagnosis:     [place]    She may return on this date: Off work as needed for symptoms thru feb 22      If you have any questions or concerns, please don't hesitate to call        Lakhwinder Bhandari MD    ______________________________           _______________          _______________  [facility] Representative                              Date                                Time

## 2022-02-16 VITALS
BODY MASS INDEX: 21.83 KG/M2 | RESPIRATION RATE: 18 BRPM | DIASTOLIC BLOOD PRESSURE: 50 MMHG | TEMPERATURE: 98.7 F | WEIGHT: 127.87 LBS | HEIGHT: 64 IN | OXYGEN SATURATION: 96 % | HEART RATE: 75 BPM | SYSTOLIC BLOOD PRESSURE: 93 MMHG

## 2022-02-16 NOTE — DISCHARGE INSTRUCTIONS
Call your ob tomorrow, or oncall ob tonight if problems or questions or symptoms return;     Lab tests; have doctor followup urine culture results; Results for Renuka Montero (MRN [id number]) as of 2/16/2022 00:15   Ref   Range 2/15/2022 20:52 2/15/2022 21:41   Sodium Latest Ref Range: 136 - 145 mmol/L 138    Potassium Latest Ref Range: 3 5 - 5 3 mmol/L 3 4 (L)    Chloride Latest Ref Range: 100 - 108 mmol/L 104    CO2 Latest Ref Range: 21 - 32 mmol/L 25    Anion Gap Latest Ref Range: 4 - 13 mmol/L 9    BUN Latest Ref Range: 5 - 25 mg/dL 6    Creatinine Latest Ref Range: 0 60 - 1 30 mg/dL 0 50 (L)    Glucose, Random Latest Ref Range: 65 - 140 mg/dL 117    Calcium Latest Ref Range: 8 3 - 10 1 mg/dL 8 6    CORRECTED CALCIUM Latest Ref Range: 8 3 - 10 1 mg/dL 9 6    AST Latest Ref Range: 5 - 45 U/L 13    ALT Latest Ref Range: 12 - 78 U/L 12    Alkaline Phosphatase Latest Ref Range: 46 - 116 U/L 53    Total Protein Latest Ref Range: 6 4 - 8 2 g/dL 6 9    Albumin Latest Ref Range: 3 5 - 5 0 g/dL 2 8 (L)    TOTAL BILIRUBIN Latest Ref Range: 0 20 - 1 00 mg/dL 0 22    eGFR Latest Units: ml/min/1 73sq m 130    LACTIC ACID Latest Ref Range: 0 5 - 2 0 mmol/L 1 2    WBC Latest Ref Range: 4 31 - 10 16 Thousand/uL 7 03    Red Blood Cell Count Latest Ref Range: 3 81 - 5 12 Million/uL 3 58 (L)    Hemoglobin Latest Ref Range: 11 5 - 15 4 g/dL 11 4 (L)    HCT Latest Ref Range: 34 8 - 46 1 % 34 1 (L)    MCV Latest Ref Range: 82 - 98 fL 95    MCH Latest Ref Range: 26 8 - 34 3 pg 31 8    MCHC Latest Ref Range: 31 4 - 37 4 g/dL 33 4    RDW Latest Ref Range: 11 6 - 15 1 % 13 7    Platelet Count Latest Ref Range: 149 - 390 Thousands/uL 228    MPV Latest Ref Range: 8 9 - 12 7 fL 11 4    nRBC Latest Units: /100 WBCs 0    Neutrophils % Latest Ref Range: 43 - 75 % 78 (H)    Immat GRANS % Latest Ref Range: 0 - 2 % 0    Lymphocytes Relative Latest Ref Range: 14 - 44 % 13 (L)    Monocytes Relative Latest Ref Range: 4 - 12 % 8    Eosinophils Latest Ref Range: 0 - 6 % 1    Basophils Relative Latest Ref Range: 0 - 1 % 0    Immature Grans Absolute Latest Ref Range: 0 00 - 0 20 Thousand/uL 0 02    Absolute Neutrophils Latest Ref Range: 1 85 - 7 62 Thousands/µL 5 53    Lymphocytes Absolute Latest Ref Range: 0 60 - 4 47 Thousands/µL 0 88    Absolute Monocytes Latest Ref Range: 0 17 - 1 22 Thousand/µL 0 54    Absolute Eosinophils Latest Ref Range: 0 00 - 0 61 Thousand/µL 0 04    Basophils Absolute Latest Ref Range: 0 00 - 0 10 Thousands/µL 0 02    HCG QUANTITATIVE Latest Ref Range: <=6 mIU/mL 24,490 5 (H)    Epithelial Cells Latest Ref Range: None Seen, Occasional /hpf  Innumerable (A)   Color, UA Unknown  Yellow   Clarity, UA Unknown  Cloudy   SL AMB SPECIFIC GRAVITY_URINE Latest Ref Range: 1 003 - 1 030   1 020   Glucose, UA Latest Ref Range: Negative mg/dl  Negative   Ketones, UA Latest Ref Range: Negative mg/dl  Negative   Blood, UA Latest Ref Range: Negative   Negative   Nitrite, UA Latest Ref Range: Negative   Negative   Leukocytes, UA Latest Ref Range: Negative   Small (A)   pH, UA Latest Ref Range: 4 5, 5 0, 5 5, 6 0, 6 5, 7 0, 7 5, 8 0   6 5   POCT URINE PROTEIN Latest Ref Range: Negative mg/dl  Negative   Bilirubin, UA Latest Ref Range: Negative   Negative   SL AMB POCT UROBILINOGEN Latest Ref Range: 0 2, 1 0 E U /dl E U /dl  1 0   RBC, UA Latest Ref Range: None Seen, 0-1, 1-2, 2-4, 0-5 /hpf  0-1   WBC, UA Latest Ref Range: None Seen, 0-1, 1-2, 0-5, 2-4 /hpf  4-10 (A)   Bacteria, UA Latest Ref Range: None Seen, Occasional /hpf  Innumerable (A)

## 2022-02-16 NOTE — ED PROVIDER NOTES
History  Chief Complaint   Patient presents with    Abdominal Pain Pregnant     abdominal pain started at approx 1630 this afternoon  states it feels like a cramping pain  she is 4 months and 5 days pregnant  states she has cyst on her right ovary  HPI  29 yo female 18 weeks pregnant; rt lowew quadrant pain,  Denies any bleeding or discharge or pregnancy changes; seen by li yañez ob feb 7, with ultrasound noting no flow         Per ob note feb 7 2022:    Problem List Items Addressed This Visit     Endocrine/Metabolic   Ovarian cyst affecting pregnancy, antepartum   Considerations:   There is a right ovarian cyst visualized on today's ultrasound   The cyst dimensions are similar in size compared to previous ultrasound  Alessia Barnes The cyst is ovoid and hypoechoic   No solid components, septations, or papillary excrescences are seen   Color Doppler interrogation reveals an absence of flow  Recommendations:   Remove the adnexal mass in the setting of acute complication such as ovarian torsion; monitor for signs and symptoms of ovarian torsion   Patient was instructed to report acute onset pelvic or back pain, nausea, vomiting, or fever to her OB provider immediately   MFM scan in 4-6 weeks to re-evaluate the adnexal mass due to size**morphological features   If the diagnosis is likely certain (i e  based on the adnexal mass' sonographic characteristics) expectant management of asymptomatic corpus luteal cysts, simple cysts, endometriomas, and mature teratomas (dermoid cyst) during pregnancy is reasonable   Consider surgical resection of asymptomatic masses present after the first trimester that are either >10 cm in diameter or if the cyst has solid, or solid and cystic components, or papillary areas, or septae  These findings increase the likelihood of malignancy, and it is desirable to diagnose malignancy, if present, at an early stage        Prior to Admission Medications   Prescriptions Last Dose Informant Patient Reported? Taking?   citalopram (CeleXA) 20 mg tablet   Yes No   Sig: Take 20 mg by mouth daily   metoclopramide (REGLAN) 10 mg tablet   No No   Sig: Take 1 tablet (10 mg total) by mouth every 6 (six) hours as needed (nausea/vomiting)      Facility-Administered Medications: None       Past Medical History:   Diagnosis Date    Bilateral ovarian cysts     Psychiatric disorder     depression       History reviewed  No pertinent surgical history  History reviewed  No pertinent family history  I have reviewed and agree with the history as documented  E-Cigarette/Vaping    E-Cigarette Use Never User      E-Cigarette/Vaping Substances     Social History     Tobacco Use    Smoking status: Former Smoker     Types: Cigarettes     Quit date: 2016     Years since quittin 6    Smokeless tobacco: Never Used   Vaping Use    Vaping Use: Never used   Substance Use Topics    Alcohol use: Yes     Comment: socially    Drug use: Not Currently       Review of Systems   Constitutional: Negative for chills and fever  HENT: Negative for ear pain and sore throat  Eyes: Negative for pain and visual disturbance  Respiratory: Negative for cough and shortness of breath  Cardiovascular: Negative for chest pain and palpitations  Gastrointestinal: Positive for abdominal pain  Negative for vomiting  Genitourinary: Positive for pelvic pain  Negative for difficulty urinating, dysuria, flank pain, hematuria, vaginal bleeding, vaginal discharge and vaginal pain  Musculoskeletal: Negative for arthralgias and back pain  Skin: Negative for color change and rash  Neurological: Negative for seizures and syncope  All other systems reviewed and are negative  Physical Exam  Physical Exam  Vitals and nursing note reviewed  Constitutional:       General: She is in acute distress  Appearance: She is well-developed  She is ill-appearing     HENT:      Head: Normocephalic and atraumatic  Mouth/Throat:      Mouth: Mucous membranes are moist    Eyes:      Extraocular Movements: Extraocular movements intact  Conjunctiva/sclera: Conjunctivae normal    Cardiovascular:      Rate and Rhythm: Normal rate and regular rhythm  Heart sounds: No murmur heard  Pulmonary:      Effort: Pulmonary effort is normal  No respiratory distress  Breath sounds: Normal breath sounds  No stridor  No wheezing, rhonchi or rales  Abdominal:      Palpations: Abdomen is soft  Tenderness: There is abdominal tenderness  There is guarding  Comments: Tender rt lower quadrant; cough rebound;    Musculoskeletal:         General: Normal range of motion  Cervical back: Neck supple  No rigidity or tenderness  Skin:     General: Skin is warm and dry  Capillary Refill: Capillary refill takes less than 2 seconds  Neurological:      General: No focal deficit present  Mental Status: She is alert and oriented to person, place, and time           Vital Signs  ED Triage Vitals [02/15/22 1945]   Temperature Pulse Respirations Blood Pressure SpO2   98 7 °F (37 1 °C) 88 18 105/62 100 %      Temp Source Heart Rate Source Patient Position - Orthostatic VS BP Location FiO2 (%)   Tympanic Monitor Lying Left arm --      Pain Score       10 - Worst Possible Pain           Vitals:    02/15/22 1945 02/15/22 2056 02/15/22 2229 02/16/22 0000   BP: 105/62 94/57 93/52 93/50   Pulse: 88 87 82 75   Patient Position - Orthostatic VS: Lying Lying Lying Sitting         Visual Acuity      ED Medications  Medications   sodium chloride 0 9 % bolus 1,000 mL (0 mL Intravenous Stopped 2/15/22 2148)   ondansetron (ZOFRAN) injection 4 mg (4 mg Intravenous Given 2/15/22 2051)   acetaminophen (TYLENOL) tablet 650 mg (650 mg Oral Given 2/15/22 2040)       Diagnostic Studies  Results Reviewed     Procedure Component Value Units Date/Time    Urine Microscopic [[id number]]  (Abnormal) Collected: 02/15/22 2141    Lab Status: Final result Specimen: Urine, Clean Catch Updated: 02/15/22 2200     RBC, UA 0-1 /hpf      WBC, UA 4-10 /hpf      Epithelial Cells Innumerable /hpf      Bacteria, UA Innumerable /hpf      URINE COMMENT --    UA w Reflex to Microscopic w Reflex to Culture [[id number]]  (Abnormal) Collected: 02/15/22 2141    Lab Status: Final result Specimen: Urine, Clean Catch Updated: 02/15/22 2150     Color, UA Yellow     Clarity, UA Cloudy     Specific [place], UA 1 020     pH, UA 6 5     Leukocytes, UA Small     Nitrite, UA Negative     Protein, UA Negative mg/dl      Glucose, UA Negative mg/dl      Ketones, UA Negative mg/dl      Urobilinogen, UA 1 0 E U /dl      Bilirubin, UA Negative     Blood, UA Negative     URINE COMMENT --    Urine culture [[id number]] Collected: 02/15/22 2141    Lab Status: In process Specimen: Urine, Clean Catch Updated: 02/15/22 2150    Quantitative hCG [[id number]]  (Abnormal) Collected: 02/15/22 2052    Lab Status: Final result Specimen: Blood from Arm, Right Updated: 02/15/22 2139     HCG, Quant 24,490 5 mIU/mL     Narrative:       Expected Ranges:     Approximate               Approximate HCG  Gestation age          Concentration ( mIU/mL)  _____________          ______________________   Yeimi Higginbotham                      HCG values  0 2-1                       5-50  1-2                           2-3                         100-5000  3-4                         500-53154  4-5                         1000-23221  5-6                         [id number]  6-8                         13416-205227  8-12                        [id number]      Lactic acid [[id number]]  (Normal) Collected: 02/15/22 2052    Lab Status: Final result Specimen: Blood from Arm, Right Updated: 02/15/22 2120     LACTIC ACID 1 2 mmol/L     Narrative:      Result may be elevated if tourniquet was used during collection      Comprehensive metabolic panel [[id number]]  (Abnormal) Collected: 02/15/22 2052    Lab Status: Final result Specimen: Blood from Arm, Right Updated: 02/15/22 2117     Sodium 138 mmol/L      Potassium 3 4 mmol/L      Chloride 104 mmol/L      CO2 25 mmol/L      ANION GAP 9 mmol/L      BUN 6 mg/dL      Creatinine 0 50 mg/dL      Glucose 117 mg/dL      Calcium 8 6 mg/dL      Corrected Calcium 9 6 mg/dL      AST 13 U/L      ALT 12 U/L      Alkaline Phosphatase 53 U/L      Total Protein 6 9 g/dL      Albumin 2 8 g/dL      Total Bilirubin 0 22 mg/dL      eGFR 130 ml/min/1 73sq m     Narrative:      National Kidney Disease Foundation guidelines for Chronic Kidney Disease (CKD):     Stage 1 with normal or high GFR (GFR > 90 mL/min/1 73 square meters)    Stage 2 Mild CKD (GFR = 60-89 mL/min/1 73 square meters)    Stage 3A Moderate CKD (GFR = 45-59 mL/min/1 73 square meters)    Stage 3B Moderate CKD (GFR = 30-44 mL/min/1 73 square meters)    Stage 4 Severe CKD (GFR = 15-29 mL/min/1 73 square meters)    Stage 5 End Stage CKD (GFR <15 mL/min/1 73 square meters)  Note: GFR calculation is accurate only with a steady state creatinine    CBC and differential [[id number]]  (Abnormal) Collected: 02/15/22 2052    Lab Status: Final result Specimen: Blood from Arm, Right Updated: 02/15/22 2105     WBC 7 03 Thousand/uL      RBC 3 58 Million/uL      Hemoglobin 11 4 g/dL      Hematocrit 34 1 %      MCV 95 fL      MCH 31 8 pg      MCHC 33 4 g/dL      RDW 13 7 %      MPV 11 4 fL      Platelets 136 Thousands/uL      nRBC 0 /100 WBCs      Neutrophils Relative 78 %      Immat GRANS % 0 %      Lymphocytes Relative 13 %      Monocytes Relative 8 %      Eosinophils Relative 1 %      Basophils Relative 0 %      Neutrophils Absolute 5 53 Thousands/µL      Immature Grans Absolute 0 02 Thousand/uL      Lymphocytes Absolute 0 88 Thousands/µL      Monocytes Absolute 0 54 Thousand/µL      Eosinophils Absolute 0 04 Thousand/µL      Basophils Absolute 0 02 Thousands/µL                  US OB limited 1 + fetuses   Final Result by Alana Nuñez DO (02/15 2256) Cystic mass in the right adnexa for which further evaluation with gynecology is recommended  Single intrauterine pregnancy at 19 weeks and 2 days gestation  Fetal anatomy was not evaluated on this study  Follow-up ultrasound to evaluate for fetal anatomy is recommended  The study was marked in [facility] for immediate notification  Workstation performed: [id number]                    Procedures  Procedures         ED Course            NDICATION: Pregnant female with abdominal pain      TECHNIQUE:  Transabdominal fetal ultrasound was performed       FINDINGS:     A single live intrauterine gestation is identified      BPD = 45 mm: 19 W 4  D   HC = 161 mm: 18 W 6 D   AC = 151 mm: 20 W 2 D   FL = 27 mm: 18 W 2 D         HC/AC ratio is normal at 1 07 ( range 1 09-1 26 )  The remaining anatomic ratios are all within normal range      The OC based on this study is July 10, 2022      Fetal anatomy was not evaluated on this study  Fetal heart rate measures 147 bpm      PLACENTA: Low-lying placenta is seen      KERRI:  Amniotic fluid volume is normal on visual inspection, with a calculated KERRI of cm      UTERUS/ADNEXA: There is a 7 2 x 3 6 cm cystic mass in the right adnexa  Arterial and venous Doppler flow is seen within the ovaries      IMPRESSION:     Cystic mass in the right adnexa for which further evaluation with gynecology is recommended      Single intrauterine pregnancy at 19 weeks and 2 days gestation  Fetal anatomy was not evaluated on this study  Follow-up ultrasound to evaluate for fetal anatomy is recommended                  spoke with on call ob gyn li cabrera [phone]; given patient improvement and flow to ovaries; suggestion to followup tomorrow with ob gyn;              Results for Mina Jaquan (MRN [id number]) as of 2/16/2022 00:15   Ref   Range 2/15/2022 20:52 2/15/2022 21:41   Sodium Latest Ref Range: 136 - 145 mmol/L 138    Potassium Latest Ref Range: 3 5 - 5 3 mmol/L 3 4 (L)    Chloride Latest Ref Range: 100 - 108 mmol/L 104    CO2 Latest Ref Range: 21 - 32 mmol/L 25    Anion Gap Latest Ref Range: 4 - 13 mmol/L 9    BUN Latest Ref Range: 5 - 25 mg/dL 6    Creatinine Latest Ref Range: 0 60 - 1 30 mg/dL 0 50 (L)    Glucose, Random Latest Ref Range: 65 - 140 mg/dL 117    Calcium Latest Ref Range: 8 3 - 10 1 mg/dL 8 6    CORRECTED CALCIUM Latest Ref Range: 8 3 - 10 1 mg/dL 9 6    AST Latest Ref Range: 5 - 45 U/L 13    ALT Latest Ref Range: 12 - 78 U/L 12    Alkaline Phosphatase Latest Ref Range: 46 - 116 U/L 53    Total Protein Latest Ref Range: 6 4 - 8 2 g/dL 6 9    Albumin Latest Ref Range: 3 5 - 5 0 g/dL 2 8 (L)    TOTAL BILIRUBIN Latest Ref Range: 0 20 - 1 00 mg/dL 0 22    eGFR Latest Units: ml/min/1 73sq m 130    LACTIC ACID Latest Ref Range: 0 5 - 2 0 mmol/L 1 2    WBC Latest Ref Range: 4 31 - 10 16 Thousand/uL 7 03    Red Blood Cell Count Latest Ref Range: 3 81 - 5 12 Million/uL 3 58 (L)    Hemoglobin Latest Ref Range: 11 5 - 15 4 g/dL 11 4 (L)    HCT Latest Ref Range: 34 8 - 46 1 % 34 1 (L)    MCV Latest Ref Range: 82 - 98 fL 95    MCH Latest Ref Range: 26 8 - 34 3 pg 31 8    MCHC Latest Ref Range: 31 4 - 37 4 g/dL 33 4    RDW Latest Ref Range: 11 6 - 15 1 % 13 7    Platelet Count Latest Ref Range: 149 - 390 Thousands/uL 228    MPV Latest Ref Range: 8 9 - 12 7 fL 11 4    nRBC Latest Units: /100 WBCs 0    Neutrophils % Latest Ref Range: 43 - 75 % 78 (H)    Immat GRANS % Latest Ref Range: 0 - 2 % 0    Lymphocytes Relative Latest Ref Range: 14 - 44 % 13 (L)    Monocytes Relative Latest Ref Range: 4 - 12 % 8    Eosinophils Latest Ref Range: 0 - 6 % 1    Basophils Relative Latest Ref Range: 0 - 1 % 0    Immature Grans Absolute Latest Ref Range: 0 00 - 0 20 Thousand/uL 0 02    Absolute Neutrophils Latest Ref Range: 1 85 - 7 62 Thousands/µL 5 53    Lymphocytes Absolute Latest Ref Range: 0 60 - 4 47 Thousands/µL 0 88    Absolute Monocytes Latest Ref Range: 0 17 - 1 22 Thousand/µL 0 54    Absolute Eosinophils Latest Ref Range: 0 00 - 0 61 Thousand/µL 0 04    Basophils Absolute Latest Ref Range: 0 00 - 0 10 Thousands/µL 0 02    HCG QUANTITATIVE Latest Ref Range: <=6 mIU/mL 24,490 5 (H)    Epithelial Cells Latest Ref Range: None Seen, Occasional /hpf  Innumerable (A)   Color, UA Unknown  Yellow   Clarity, UA Unknown  Cloudy   SL AMB SPECIFIC GRAVITY_URINE Latest Ref Range: 1 003 - 1 030   1 020   Glucose, UA Latest Ref Range: Negative mg/dl  Negative   Ketones, UA Latest Ref Range: Negative mg/dl  Negative   Blood, UA Latest Ref Range: Negative   Negative   Nitrite, UA Latest Ref Range: Negative   Negative   Leukocytes, UA Latest Ref Range: Negative   Small (A)   pH, UA Latest Ref Range: 4 5, 5 0, 5 5, 6 0, 6 5, 7 0, 7 5, 8 0   6 5   POCT URINE PROTEIN Latest Ref Range: Negative mg/dl  Negative   Bilirubin, UA Latest Ref Range: Negative   Negative   SL AMB POCT UROBILINOGEN Latest Ref Range: 0 2, 1 0 E U /dl E U /dl  1 0   RBC, UA Latest Ref Range: None Seen, 0-1, 1-2, 2-4, 0-5 /hpf  0-1   WBC, UA Latest Ref Range: None Seen, 0-1, 1-2, 0-5, 2-4 /hpf  4-10 (A)   Bacteria, UA Latest Ref Range: None Seen, Occasional /hpf  Innumerable (A)               MDM  At CT, remains recovered from the severe pain earlier, no acute distress; discussed with ob on call, patient,   Disposition  Final diagnoses:   Abdominal pain, unspecified abdominal location   Ovarian cyst affecting pregnancy in second trimester, antepartum     Time reflects when diagnosis was documented in both MDM as applicable and the Disposition within this note     Time User Action Codes Description Comment    2/15/2022  7:59 PM Rebecca Pall Add [R10 9] Abdominal pain, unspecified abdominal location     2/16/2022 12:13 AM Rebecca Pall Add [O34 [id number],  [id number]] Ovarian cyst affecting pregnancy in second trimester, antepartum       ED Disposition     ED Disposition Condition Date/Time Comment    Discharge Stable Wed Feb 16, 2022 12:13 AM Ashley Fox discharge to home/self care  Follow-up Information    None         Discharge Medication List as of 2/16/2022 12:16 AM      CONTINUE these medications which have NOT CHANGED    Details   citalopram (CeleXA) 20 mg tablet Take 20 mg by mouth daily, Historical Med      metoclopramide (REGLAN) 10 mg tablet Take 1 tablet (10 mg total) by mouth every 6 (six) hours as needed (nausea/vomiting), Starting Thu 3/19/2020, Normal             No discharge procedures on file      PDMP Review     None          ED Provider  Electronically Signed by           Abelino Leyden, MD  02/16/22 7169

## 2022-02-17 DIAGNOSIS — R82.71 BACTERIURIA IN PREGNANCY: Primary | ICD-10-CM

## 2022-02-17 DIAGNOSIS — O99.891 BACTERIURIA IN PREGNANCY: Primary | ICD-10-CM

## 2022-02-17 RX ORDER — CEPHALEXIN 500 MG/1
500 CAPSULE ORAL EVERY 8 HOURS SCHEDULED
Qty: 21 CAPSULE | Refills: 0 | Status: SHIPPED | OUTPATIENT
Start: 2022-02-17 | End: 2022-02-24

## 2022-02-18 LAB
BACTERIA UR CULT: ABNORMAL
BACTERIA UR CULT: ABNORMAL

## 2022-06-01 ENCOUNTER — HOSPITAL ENCOUNTER (EMERGENCY)
Facility: HOSPITAL | Age: 32
Discharge: HOME | End: 2022-06-01
Attending: EMERGENCY MEDICINE | Admitting: EMERGENCY MEDICINE
Payer: COMMERCIAL

## 2022-06-01 VITALS
RESPIRATION RATE: 18 BRPM | OXYGEN SATURATION: 100 % | DIASTOLIC BLOOD PRESSURE: 68 MMHG | BODY MASS INDEX: 24.16 KG/M2 | HEART RATE: 81 BPM | WEIGHT: 141.54 LBS | TEMPERATURE: 98.6 F | HEIGHT: 64 IN | SYSTOLIC BLOOD PRESSURE: 109 MMHG

## 2022-06-01 DIAGNOSIS — R10.9 ABDOMINAL PAIN DURING PREGNANCY IN THIRD TRIMESTER: Primary | ICD-10-CM

## 2022-06-01 DIAGNOSIS — O26.893 ABDOMINAL PAIN DURING PREGNANCY IN THIRD TRIMESTER: Primary | ICD-10-CM

## 2022-06-01 LAB
FLUAV RNA RESP QL NAA+PROBE: NEGATIVE
FLUBV RNA RESP QL NAA+PROBE: NEGATIVE
RSV RNA RESP QL NAA+PROBE: NEGATIVE
SARS-COV-2 RNA RESP QL NAA+PROBE: NEGATIVE

## 2022-06-01 PROCEDURE — 99285 EMERGENCY DEPT VISIT HI MDM: CPT | Performed by: EMERGENCY MEDICINE

## 2022-06-01 PROCEDURE — 0241U HB NFCT DS VIR RESP RNA 4 TRGT: CPT | Performed by: EMERGENCY MEDICINE

## 2022-06-01 PROCEDURE — 99284 EMERGENCY DEPT VISIT MOD MDM: CPT

## 2022-06-01 NOTE — ED PROVIDER NOTES
History  Chief Complaint   Patient presents with    Pregnancy Problem     7 months and 20 days pregnant pt 5th pregnancy, states "it looks weird down there" thinks she may by in labor  Thinks she may be having drainage as well  Has been induced for all other pregnancies, denies felling contractions     This is a 29-year-old female  at approximately 7 months 20 days gestation who presents with abdominal pain  At around 9:00 p m , patient started to experience diffuse abdominal cramping which is intermittent in nature  States that the cramping is approximately 10 minutes apart  She is unsure if she has any vaginal leakage  Denies any vaginal bleeding  Still feeling the baby move  Denies fever/chills, nausea/vomiting, lightheadedness/dizziness, numbness/weakness, headache, change in vision, URI symptoms, neck pain, chest pain, palpitations, shortness of breath, cough, back pain, flank pain, diarrhea, hematochezia, melena, dysuria, hematuria, abnormal vaginal discharge/bleeding  Prior to Admission Medications   Prescriptions Last Dose Informant Patient Reported? Taking?   citalopram (CeleXA) 20 mg tablet   Yes No   Sig: Take 20 mg by mouth daily   metoclopramide (REGLAN) 10 mg tablet   No No   Sig: Take 1 tablet (10 mg total) by mouth every 6 (six) hours as needed (nausea/vomiting)      Facility-Administered Medications: None       Past Medical History:   Diagnosis Date    Bilateral ovarian cysts     Psychiatric disorder     depression       History reviewed  No pertinent surgical history  History reviewed  No pertinent family history  I have reviewed and agree with the history as documented      E-Cigarette/Vaping    E-Cigarette Use Never User      E-Cigarette/Vaping Substances     Social History     Tobacco Use    Smoking status: Former Smoker     Types: Cigarettes     Quit date: 2016     Years since quittin 9    Smokeless tobacco: Never Used   Vaping Use    Vaping Use: Never used   Substance Use Topics    Alcohol use: Yes     Comment: socially    Drug use: Not Currently       Review of Systems   Constitutional: Negative for chills and fever  HENT: Negative for rhinorrhea, sore throat and trouble swallowing  Eyes: Negative for photophobia and visual disturbance  Respiratory: Negative for cough, chest tightness and shortness of breath  Cardiovascular: Negative for chest pain, palpitations and leg swelling  Gastrointestinal: Positive for abdominal pain  Negative for blood in stool, diarrhea, nausea and vomiting  Endocrine: Negative for polyuria  Genitourinary: Negative for dysuria, flank pain, hematuria, vaginal bleeding and vaginal discharge  Musculoskeletal: Negative for back pain and neck pain  Skin: Negative for color change and rash  Allergic/Immunologic: Negative for immunocompromised state  Neurological: Negative for dizziness, weakness, light-headedness, numbness and headaches  All other systems reviewed and are negative  Physical Exam  Physical Exam  Exam conducted with a chaperone present Kavon Mcwilliams)  Constitutional:       General: She is not in acute distress  Appearance: Normal appearance  She is well-developed  HENT:      Mouth/Throat:      Pharynx: Uvula midline  Eyes:      Conjunctiva/sclera: Conjunctivae normal       Pupils: Pupils are equal, round, and reactive to light  Neck:      Thyroid: No thyroid mass or thyromegaly  Trachea: Trachea normal    Cardiovascular:      Rate and Rhythm: Normal rate and regular rhythm  Pulses: Normal pulses  Heart sounds: Normal heart sounds  No murmur heard  Pulmonary:      Effort: Pulmonary effort is normal       Breath sounds: Normal breath sounds  Abdominal:      General: Bowel sounds are normal       Palpations: Abdomen is soft  Tenderness: There is no abdominal tenderness  There is no guarding or rebound  Comments: Gravid abdomen     Genitourinary:     Comments: Cervix is 1cm dilated  Skin:     General: Skin is warm and dry  Neurological:      Mental Status: She is alert  Psychiatric:         Speech: Speech normal          Behavior: Behavior normal  Behavior is cooperative  Thought Content: Thought content normal          Vital Signs  ED Triage Vitals [06/01/22 0208]   Temperature Pulse Respirations Blood Pressure SpO2   98 6 °F (37 °C) 81 18 109/68 100 %      Temp Source Heart Rate Source Patient Position - Orthostatic VS BP Location FiO2 (%)   Temporal Monitor Lying Left arm --      Pain Score       No Pain           Vitals:    06/01/22 0208   BP: 109/68   Pulse: 81   Patient Position - Orthostatic VS: Lying         Visual Acuity      ED Medications  Medications - No data to display    Diagnostic Studies  Results Reviewed     Procedure Component Value Units Date/Time    COVID/FLU/RSV - 2 hour TAT [[id number]] Collected: 06/01/22 0254    Lab Status: In process Specimen: Nares from Nose Updated: 06/01/22 0258                 No orders to display              Procedures  Procedures         ED Course  ED Course as of 06/01/22 0320   Wed Jun 01, 2022   0316 Spoke with OBGYN at [facility] regarding the patient's case  I described the patient's presentation and physical exam findings  Also shared by ultrasound findings  Provider requested that I perform a vaginal exam   I did performed a vaginal exam which revealed the cervix being approximately 1 cm dilated  I relayed this information to the [facility] [place] on-call for [facility]  States that the patient is not in labor  However, the patient can come to their hospital to be evaluated  I spoke with the patient about the conversation I had with the OBGYN  Patient would prefer to go home  If her symptoms get worse or she believes that she is in labor, she will either call EMS or drive to [facility]  Information related to transfer center and on-call OBGYN  Strict return precautions given  MDM  Number of Diagnoses or Management Options  Abdominal pain during pregnancy in third trimester  Diagnosis management comments: Fetal heart tones approximately 126 beats per minute  Good fetal movement on ultrasound performed by myself  Patient is planning on delivering at [facility]  I did discuss the likelihood of needing transfer for fetal monitoring  Patient would prefer to go by private vehicle  Will discuss with her OBGYN  Disposition  Final diagnoses:   Abdominal pain during pregnancy in third trimester     Time reflects when diagnosis was documented in both MDM as applicable and the Disposition within this note     Time User Action Codes Description Comment    6/1/2022  2:27 AM Deion Beasley Add [W95 [id number],  R10 9] Abdominal pain during pregnancy in third trimester       ED Disposition     ED Disposition   Discharge    Condition   Stable    Date/Time   Wed Jun 1, 2022  3:19 AM    Comment              Follow-up Information     Follow up With Specialties Details Why Contact Info Additional [address] [facility] Emergency Department Emergency Medicine Go to  If symptoms worsen [facility] [address] [phone]  [address] [facility] Emergency Department, [place] [address], [place], [address] [place] [address]    Your OB/Gyn  Call  in the AM for follow up            Patient's Medications   Discharge Prescriptions    No medications on file       No discharge procedures on file      PDMP Review     None          ED Provider  Electronically Signed by           Jenny Patino MD  06/01/22 5526

## 2023-09-29 ENCOUNTER — OFFICE VISIT (OUTPATIENT)
Dept: URGENT CARE | Facility: CLINIC | Age: 33
End: 2023-09-29
Payer: COMMERCIAL

## 2023-09-29 VITALS
SYSTOLIC BLOOD PRESSURE: 106 MMHG | OXYGEN SATURATION: 98 % | WEIGHT: 127 LBS | BODY MASS INDEX: 21.68 KG/M2 | TEMPERATURE: 99.5 F | RESPIRATION RATE: 18 BRPM | HEART RATE: 98 BPM | HEIGHT: 64 IN | DIASTOLIC BLOOD PRESSURE: 65 MMHG

## 2023-09-29 DIAGNOSIS — Z02.4 DRIVER'S PERMIT PE (PHYSICAL EXAMINATION): Primary | ICD-10-CM

## 2023-09-29 NOTE — PROGRESS NOTES
[facility]        NAME: Hi Quintana is a 33 y.o. female  : 1990    MRN: [id number]  DATE: 2023  TIME: 11:50 AM    Assessment and Plan   's permit PE (physical examination) [Z02.4]  1. 's permit PE (physical examination)              Patient Instructions     Patient is qualified. See scanned physical form. **Portions of the record may have been created with voice recognition software. Occasional wrong word or "sound a like" substitutions may have occurred due to the inherent limitations of voice recognition software. Read the chart carefully and recognize, using context, where substitutions have occurred. **     Chief Complaint     Chief Complaint   Patient presents with   • Physical Exam     Pt presents for Drivers permit physical.         History of Present Illness     33 y.o. female presents to clinic today for a  permit physical. Patient denies any known chronic medical issues and does not take any OTC or prescribed medications. Patient is feeling well today with no complaints. Review of Systems     Review of Systems   Constitutional: Negative for activity change, fatigue, fever and unexpected weight change. HENT: Negative for hearing loss and trouble swallowing. Eyes: Negative for photophobia and visual disturbance. Respiratory: Negative for shortness of breath. Cardiovascular: Negative for chest pain and palpitations. Gastrointestinal: Negative for abdominal pain, constipation and diarrhea. Musculoskeletal: Negative for arthralgias, myalgias and neck pain. Skin: Negative for rash. Neurological: Negative for dizziness, seizures, syncope, weakness, light-headedness and headaches. Hematological: Negative for adenopathy.        Current Medications     Current Outpatient Medications:   •  citalopram (CeleXA) 20 mg tablet, Take 20 mg by mouth daily (Patient not taking: Reported on 2023), Disp: , Rfl:   •  metoclopramide (REGLAN) 10 mg tablet, Take 1 tablet (10 mg total) by mouth every 6 (six) hours as needed (nausea/vomiting) (Patient not taking: Reported on 9/29/2023), Disp: 12 tablet, Rfl: 0    Current Allergies     Allergies as of 09/29/2023   • (No Known Allergies)            The following portions of the patient's history were reviewed and updated as appropriate: allergies, current medications, past family history, past medical history, past social history, past surgical history and problem list.     Past Medical History:   Diagnosis Date   • Bilateral ovarian cysts    • Psychiatric disorder     depression       History reviewed. No pertinent surgical history. History reviewed. No pertinent family history. Medications have been verified. Objective     /65 (BP Location: Left arm, Patient Position: Sitting, Cuff Size: Standard)   Pulse 98   Temp 99.5 °F (37.5 °C) (Tympanic)   Resp 18   Ht 5' 4" (1.626 m)   Wt 57.6 kg (127 lb)   LMP 09/08/2023   SpO2 98%   BMI 21.80 kg/m²        Physical Exam     Physical Exam  Vitals and nursing note reviewed. Constitutional:       General: Not in acute distress. Appearance: Normal appearance. Does not appear ill. HENT:      Head: Normocephalic and atraumatic. Right Ear: Tympanic membrane normal.      Left Ear: Tympanic membrane normal.      Nose: Nose normal.      Mouth/Throat:      Mouth: Mucous membranes are moist.      Pharynx: Oropharynx is clear. Cardiovascular:      Rate and Rhythm: Normal rate and regular rhythm. Pulses: Normal pulses. Heart sounds: Normal heart sounds. Pulmonary:      Effort: Pulmonary effort is normal.      Breath sounds: Normal breath sounds. Lymphadenopathy:      Cervical: No cervical adenopathy. Skin:     General: Skin is warm and dry. Findings: No rash. Neurological:      Mental Status: Awake, Alert, and Oriented.

## 2024-02-26 ENCOUNTER — HOSPITAL ENCOUNTER (EMERGENCY)
Facility: HOSPITAL | Age: 34
Discharge: HOME | End: 2024-02-26
Attending: EMERGENCY MEDICINE | Admitting: EMERGENCY MEDICINE
Payer: COMMERCIAL

## 2024-02-26 VITALS
RESPIRATION RATE: 16 BRPM | HEART RATE: 84 BPM | OXYGEN SATURATION: 98 % | DIASTOLIC BLOOD PRESSURE: 70 MMHG | TEMPERATURE: 98.6 F | SYSTOLIC BLOOD PRESSURE: 97 MMHG

## 2024-02-26 DIAGNOSIS — H66.90 OTITIS MEDIA: Primary | ICD-10-CM

## 2024-02-26 PROCEDURE — 99284 EMERGENCY DEPT VISIT MOD MDM: CPT | Performed by: EMERGENCY MEDICINE

## 2024-02-26 PROCEDURE — 99282 EMERGENCY DEPT VISIT SF MDM: CPT

## 2024-02-26 RX ORDER — AMOXICILLIN 500 MG/1
500 CAPSULE ORAL 3 TIMES DAILY
Qty: 30 CAPSULE | Refills: 0 | Status: SHIPPED | OUTPATIENT
Start: 2024-02-26 | End: 2024-03-07

## 2024-02-26 NOTE — ED PROVIDER NOTES
History  Chief Complaint   Patient presents with    Fever     Patient c/o fever, cough and ear ache.      Patient is a 33-year-old female presenting to the emergency department complaint of fever, cough, right ear pain over the past 4 days, denies any specific sick contacts, has been using OTC meds for symptom control, cough is nonproductive, no vomiting or diarrhea        Prior to Admission Medications   Prescriptions Last Dose Informant Patient Reported? Taking?   citalopram (CeleXA) 20 mg tablet Not Taking  Yes No   Sig: Take 20 mg by mouth daily   Patient not taking: Reported on 2023   metoclopramide (REGLAN) 10 mg tablet Not Taking  No No   Sig: Take 1 tablet (10 mg total) by mouth every 6 (six) hours as needed (nausea/vomiting)   Patient not taking: Reported on 2023      Facility-Administered Medications: None       Past Medical History:   Diagnosis Date    Bilateral ovarian cysts     Psychiatric disorder     depression       History reviewed. No pertinent surgical history.    History reviewed. No pertinent family history.  I have reviewed and agree with the history as documented.    E-Cigarette/Vaping    E-Cigarette Use Never User      E-Cigarette/Vaping Substances     Social History     Tobacco Use    Smoking status: Former     Current packs/day: 0.00     Types: Cigarettes     Quit date: 2016     Years since quittin.6    Smokeless tobacco: Never   Vaping Use    Vaping status: Never Used   Substance Use Topics    Alcohol use: Yes     Comment: socially    Drug use: Not Currently       Review of Systems   Constitutional:  Positive for fever.   HENT:  Positive for congestion, ear pain and sore throat.    Eyes: Negative.    Respiratory:  Positive for cough.    Cardiovascular: Negative.    Gastrointestinal: Negative.    Endocrine: Negative.    Genitourinary: Negative.    Musculoskeletal: Negative.    Skin: Negative.    Allergic/Immunologic: Negative.    Neurological: Negative.    Hematological:  Negative.    Psychiatric/Behavioral: Negative.         Physical Exam  Physical Exam  Constitutional:       Appearance: She is well-developed.   HENT:      Head: Normocephalic and atraumatic.      Right Ear: Tympanic membrane is erythematous and bulging.      Left Ear: Tympanic membrane is erythematous.      Mouth/Throat:      Pharynx: Posterior oropharyngeal erythema present. No oropharyngeal exudate.   Eyes:      Conjunctiva/sclera: Conjunctivae normal.      Pupils: Pupils are equal, round, and reactive to light.   Cardiovascular:      Rate and Rhythm: Normal rate.   Pulmonary:      Effort: Pulmonary effort is normal.   Abdominal:      Palpations: Abdomen is soft.   Musculoskeletal:         General: Normal range of motion.      Cervical back: Normal range of motion and neck supple.   Skin:     General: Skin is warm and dry.   Neurological:      Mental Status: She is alert and oriented to person, place, and time.         Vital Signs  ED Triage Vitals [02/26/24 1647]   Temperature Pulse Respirations Blood Pressure SpO2   98.6 °F (37 °C) 84 16 97/70 98 %      Temp Source Heart Rate Source Patient Position - Orthostatic VS BP Location FiO2 (%)   Temporal Monitor Sitting Right arm --      Pain Score       --           Vitals:    02/26/24 1647   BP: 97/70   Pulse: 84   Patient Position - Orthostatic VS: Sitting         Visual Acuity      ED Medications  Medications - No data to display    Diagnostic Studies  Results Reviewed       None                   No orders to display              Procedures  Procedures         ED Course                               SBIRT 20yo+      Flowsheet Row Most Recent Value   Initial Alcohol Screen: US AUDIT-C     1. How often do you have a drink containing alcohol? 0 Filed at: 02/26/2024 1649   2. How many drinks containing alcohol do you have on a typical day you are drinking?  0 Filed at: 02/26/2024 1649   3a. Male UNDER 65: How often do you have five or more drinks on one occasion? 0  Filed at: 02/26/2024 1649   3b. FEMALE Any Age, or MALE 65+: How often do you have 4 or more drinks on one occassion? 0 Filed at: 02/26/2024 1649   Audit-C Score 0 Filed at: 02/26/2024 1649   DELON: How many times in the past year have you...    Used an illegal drug or used a prescription medication for non-medical reasons? Never Filed at: 02/26/2024 1649                      Medical Decision Making  Exam and history consistent with acute otitis media.  I have a low suspicion at this time for mastoiditis, malignant otitis externa, no evidence of retained foreign body.   Provided with with prescription for oral antibiotics.  Advised to take Tylenol or Motrin as needed for pain or fever.  Recommended plenty of fluids, close follow-up with PCP or return if symptoms worsen, parent acknowledges understanding and agreement with this plan.      Problems Addressed:  Otitis media: acute illness or injury    Risk  OTC drugs.  Prescription drug management.             Disposition  Final diagnoses:   Otitis media     Time reflects when diagnosis was documented in both MDM as applicable and the Disposition within this note       Time User Action Codes Description Comment    2/26/2024  5:01 PM Edwige Ruano Add [H66.90] Otitis media           ED Disposition       ED Disposition   Discharge    Condition   Stable    Date/Time   Mon Feb 26, 2024 1701    Comment   Mecca Romero discharge to home/self care.                   Follow-up Information       Follow up With Specialties Details Why Contact Info    [facility] In 2 days As needed [address] [place] [id number]  [phone]              Discharge Medication List as of 2/26/2024  5:02 PM        START taking these medications    Details   amoxicillin (AMOXIL) 500 mg capsule Take 1 capsule (500 mg total) by mouth 3 (three) times a day for 10 days, Starting Mon 2/26/2024, Until Thu 3/7/2024, Normal           CONTINUE these medications which have NOT  CHANGED    Details   citalopram (CeleXA) 20 mg tablet Take 20 mg by mouth daily, Historical Med      metoclopramide (REGLAN) 10 mg tablet Take 1 tablet (10 mg total) by mouth every 6 (six) hours as needed (nausea/vomiting), Starting Thu 3/19/2020, Normal             No discharge procedures on file.    PDMP Review       None            ED Provider  Electronically Signed by             Edwige Ruano DO  02/26/24 [id number]

## 2024-04-29 ENCOUNTER — APPOINTMENT (EMERGENCY)
Dept: RADIOLOGY | Facility: HOSPITAL | Age: 34
End: 2024-04-29
Payer: COMMERCIAL

## 2024-04-29 ENCOUNTER — HOSPITAL ENCOUNTER (EMERGENCY)
Facility: HOSPITAL | Age: 34
Discharge: HOME | End: 2024-04-29
Attending: EMERGENCY MEDICINE | Admitting: EMERGENCY MEDICINE
Payer: COMMERCIAL

## 2024-04-29 VITALS
SYSTOLIC BLOOD PRESSURE: 98 MMHG | TEMPERATURE: 98.9 F | RESPIRATION RATE: 18 BRPM | OXYGEN SATURATION: 99 % | DIASTOLIC BLOOD PRESSURE: 67 MMHG | HEART RATE: 73 BPM

## 2024-04-29 DIAGNOSIS — J40 BRONCHITIS: Primary | ICD-10-CM

## 2024-04-29 PROCEDURE — 94640 AIRWAY INHALATION TREATMENT: CPT

## 2024-04-29 PROCEDURE — 71045 X-RAY EXAM CHEST 1 VIEW: CPT

## 2024-04-29 PROCEDURE — 99284 EMERGENCY DEPT VISIT MOD MDM: CPT | Performed by: PHYSICIAN ASSISTANT

## 2024-04-29 PROCEDURE — 99283 EMERGENCY DEPT VISIT LOW MDM: CPT

## 2024-04-29 RX ORDER — ALBUTEROL SULFATE 90 UG/1
2 AEROSOL, METERED RESPIRATORY (INHALATION) EVERY 6 HOURS PRN
Qty: 18 G | Refills: 0 | Status: SHIPPED | OUTPATIENT
Start: 2024-04-29

## 2024-04-29 RX ORDER — IPRATROPIUM BROMIDE AND ALBUTEROL SULFATE 2.5; .5 MG/3ML; MG/3ML
3 SOLUTION RESPIRATORY (INHALATION) ONCE
Status: COMPLETED | OUTPATIENT
Start: 2024-04-29 | End: 2024-04-29

## 2024-04-29 RX ORDER — PREDNISONE 20 MG/1
TABLET ORAL
Qty: 18 TABLET | Refills: 0 | Status: SHIPPED | OUTPATIENT
Start: 2024-04-29

## 2024-04-29 RX ORDER — ALBUTEROL SULFATE 2.5 MG/3ML
2.5 SOLUTION RESPIRATORY (INHALATION) EVERY 6 HOURS PRN
Qty: 75 ML | Refills: 0 | Status: SHIPPED | OUTPATIENT
Start: 2024-04-29

## 2024-04-29 RX ADMIN — IPRATROPIUM BROMIDE AND ALBUTEROL SULFATE 3 ML: .5; 3 SOLUTION RESPIRATORY (INHALATION) at 17:06

## 2024-04-29 NOTE — ED NOTES
Nebulizer given to patient with discharge instructions. Pt ambulatory upon dc      Maricruz King RN  04/29/24 5752

## 2024-04-29 NOTE — ED PROVIDER NOTES
History  Chief Complaint   Patient presents with    Cough     Pt states that she has had a cough, fevers on and off, vomiting for the past 4 weeks. Pt attempted over the counter cough medication with no relief. Pt coughing up green mucus.      This is a 33-year-old female presenting to the emergency department today offering a 6-week history of nasal congestion and coughing.  Cough is dry nonproductive she feels that she is wheezing.  She states she has a history of this happening a few times in the spring or fall months.  Never truly diagnosed with asthma.  No chest pain.  No current fevers today vital signs reviewed within normal limits.        Prior to Admission Medications   Prescriptions Last Dose Informant Patient Reported? Taking?   citalopram (CeleXA) 20 mg tablet   Yes No   Sig: Take 20 mg by mouth daily   Patient not taking: Reported on 2023   metoclopramide (REGLAN) 10 mg tablet   No No   Sig: Take 1 tablet (10 mg total) by mouth every 6 (six) hours as needed (nausea/vomiting)   Patient not taking: Reported on 2023      Facility-Administered Medications: None       Past Medical History:   Diagnosis Date    Bilateral ovarian cysts     Psychiatric disorder     depression       History reviewed. No pertinent surgical history.    History reviewed. No pertinent family history.  I have reviewed and agree with the history as documented.    E-Cigarette/Vaping    E-Cigarette Use Never User      E-Cigarette/Vaping Substances     Social History     Tobacco Use    Smoking status: Former     Current packs/day: 0.00     Types: Cigarettes     Quit date: 2016     Years since quittin.8    Smokeless tobacco: Never   Vaping Use    Vaping status: Never Used   Substance Use Topics    Alcohol use: Yes     Comment: socially    Drug use: Not Currently       Review of Systems   Constitutional:  Negative for chills and fever.   HENT:  Negative for ear pain and sore throat.    Eyes:  Negative for pain and visual  disturbance.   Respiratory:  Positive for cough and wheezing. Negative for shortness of breath.    Cardiovascular:  Negative for chest pain and palpitations.   Gastrointestinal:  Negative for abdominal pain and vomiting.   Genitourinary:  Negative for dysuria and hematuria.   Musculoskeletal:  Negative for arthralgias and back pain.   Skin:  Negative for color change and rash.   Neurological:  Negative for seizures and syncope.   All other systems reviewed and are negative.      Physical Exam  Physical Exam  Constitutional:       Appearance: She is well-developed. She is not ill-appearing.   HENT:      Right Ear: External ear normal. No swelling. Tympanic membrane is not bulging.      Left Ear: External ear normal. No swelling. Tympanic membrane is not bulging.      Nose: Nose normal.      Mouth/Throat:      Pharynx: No oropharyngeal exudate.   Eyes:      General: Lids are normal.      Conjunctiva/sclera: Conjunctivae normal.      Pupils: Pupils are equal, round, and reactive to light.   Neck:      Thyroid: No thyromegaly.      Vascular: No JVD.      Trachea: No tracheal deviation.   Cardiovascular:      Rate and Rhythm: Normal rate and regular rhythm.      Pulses: Normal pulses.      Heart sounds: Normal heart sounds. No murmur heard.     No friction rub. No gallop.   Pulmonary:      Effort: Pulmonary effort is normal. No respiratory distress.      Breath sounds: No stridor. Wheezing present. No rales.   Chest:      Chest wall: No tenderness.   Abdominal:      General: Bowel sounds are normal. There is no distension.      Palpations: Abdomen is soft. There is no mass.      Tenderness: There is no abdominal tenderness. There is no guarding or rebound.      Hernia: No hernia is present.   Musculoskeletal:         General: Normal range of motion.      Cervical back: Normal range of motion and neck supple. No edema. Normal range of motion.   Lymphadenopathy:      Cervical: No cervical adenopathy.   Skin:     General:  Skin is warm and dry.      Coloration: Skin is not pale.      Findings: No erythema or rash.   Neurological:      Mental Status: She is alert and oriented to person, place, and time.      GCS: GCS eye subscore is 4. GCS verbal subscore is 5. GCS motor subscore is 6.      Cranial Nerves: No cranial nerve deficit.      Sensory: No sensory deficit.      Deep Tendon Reflexes: Reflexes are normal and symmetric.   Psychiatric:         Speech: Speech normal.         Behavior: Behavior normal.         Vital Signs  ED Triage Vitals [04/29/24 1632]   Temperature Pulse Respirations Blood Pressure SpO2   98.9 °F (37.2 °C) 73 18 98/67 99 %      Temp Source Heart Rate Source Patient Position - Orthostatic VS BP Location FiO2 (%)   Temporal Monitor Sitting Right arm --      Pain Score       6           Vitals:    04/29/24 1632   BP: 98/67   Pulse: 73   Patient Position - Orthostatic VS: Sitting         Visual Acuity      ED Medications  Medications   ipratropium-albuterol (DUO-NEB) 0.5-2.5 mg/3 mL inhalation solution 3 mL (3 mL Nebulization Given 4/29/24 1706)       Diagnostic Studies  Results Reviewed       None                   XR chest 1 view portable   ED Interpretation by Shashi Thompson PA-C (04/29 1741)   Chest x-ray reviewed no acute cardiopulmonary process no pneumothorax or effusions.                 Procedures  Procedures         ED Course  ED Course as of 04/29/24 1747 Mon Apr 29, 2024   1637 SpO2: 99 %   1637 Respirations: 18   1637 Pulse: 73   1637 Temperature: 98.9 °F (37.2 °C)   1637 Blood Pressure: 98/67                               SBIRT 22yo+      Flowsheet Row Most Recent Value   Initial Alcohol Screen: US AUDIT-C     1. How often do you have a drink containing alcohol? 0 Filed at: 04/29/2024 1631   2. How many drinks containing alcohol do you have on a typical day you are drinking?  0 Filed at: 04/29/2024 1631   3a. Male UNDER 65: How often do you have five or more drinks on one occasion? 0 Filed at:  04/29/2024 1631   3b. FEMALE Any Age, or MALE 65+: How often do you have 4 or more drinks on one occassion? 0 Filed at: 04/29/2024 1631   Audit-C Score 0 Filed at: 04/29/2024 1631   DELON: How many times in the past year have you...    Used an illegal drug or used a prescription medication for non-medical reasons? Never Filed at: 04/29/2024 1631                      Medical Decision Making  33-year-old female here for evaluation of ongoing cough wheezing.  Nasal congestion symptoms ongoing for 6 weeks see HPI for further details differential diagnosis includes asthma, pneumonia, pneumothorax, COVID, influenza.  X-rays negative for pneumonia, no effusion, with her wheezing and seasonal component this may be    Asthma related I recommend she follows up with PCP/pulmonology will ensure that she has nebulizer machine nebulizer solution and Ventolin at home will do a short course of prednisone.    Amount and/or Complexity of Data Reviewed  Radiology: ordered and independent interpretation performed.    Risk  Prescription drug management.             Disposition  Final diagnoses:   Bronchitis     Time reflects when diagnosis was documented in both MDM as applicable and the Disposition within this note       Time User Action Codes Description Comment    4/29/2024  5:41 PM Shashi Thompson Add [J40] Bronchitis           ED Disposition       ED Disposition   Discharge    Condition   Stable    Date/Time   Mon Apr 29, 2024 1741    Comment   Mecca Romero discharge to home/self care.                   Follow-up Information       Follow up With Specialties Details Why Contact Info    [facility] Schedule an appointment as soon as possible for a visit  As needed [address] [place] [phone]              Patient's Medications   Discharge Prescriptions    ALBUTEROL (2.5 MG/3 ML) 0.083 % NEBULIZER SOLUTION    Take 3 mL (2.5 mg total) by nebulization every 6 (six) hours as needed for  wheezing or shortness of breath       Start Date: 4/29/2024 End Date: --       Order Dose: 2.5 mg       Quantity: 75 mL    Refills: 0    ALBUTEROL (VENTOLIN HFA) 90 MCG/ACT INHALER    Inhale 2 puffs every 6 (six) hours as needed for wheezing       Start Date: 4/29/2024 End Date: --       Order Dose: 2 puffs       Quantity: 18 g    Refills: 0    PREDNISONE 20 MG TABLET    Take 3 tablets daily for 3 days followed by 2 tablets daily for 3 days followed by 1 tablet daily for 3 days       Start Date: 4/29/2024 End Date: --       Order Dose: --       Quantity: 18 tablet    Refills: 0       No discharge procedures on file.    PDMP Review       None            ED Provider  Electronically Signed by             Shashi Thompson PA-C  04/29/24 1744       Shashi Thompson PA-C  04/29/24 1747

## 2024-04-29 NOTE — Clinical Note
Mecca Romero was seen and treated in our emergency department on 4/29/2024.    No restrictions            Diagnosis: asthma exacerbation    Mecca  .    She may return on this date: 04/29/2024    Patient may return to work as soon as possible she is not infectious.     If you have any questions or concerns, please don't hesitate to call.      Shashi Thompson PA-C    ______________________________           _______________          _______________  Hospital Representative                              Date                                Time

## 2025-05-22 ENCOUNTER — HOSPITAL ENCOUNTER (EMERGENCY)
Facility: HOSPITAL | Age: 35
Discharge: HOME | End: 2025-05-22
Attending: EMERGENCY MEDICINE
Payer: COMMERCIAL

## 2025-05-22 VITALS
RESPIRATION RATE: 18 BRPM | WEIGHT: 112.88 LBS | TEMPERATURE: 99.1 F | OXYGEN SATURATION: 97 % | SYSTOLIC BLOOD PRESSURE: 102 MMHG | DIASTOLIC BLOOD PRESSURE: 61 MMHG | HEART RATE: 72 BPM | BODY MASS INDEX: 18.81 KG/M2 | HEIGHT: 65 IN

## 2025-05-22 DIAGNOSIS — D69.6 THROMBOCYTOPENIA (HCC): ICD-10-CM

## 2025-05-22 DIAGNOSIS — D64.9 ANEMIA: ICD-10-CM

## 2025-05-22 DIAGNOSIS — N39.0 UTI (URINARY TRACT INFECTION): Primary | ICD-10-CM

## 2025-05-22 LAB
ALBUMIN SERPL BCG-MCNC: 4.2 G/DL (ref 3.5–5)
ALP SERPL-CCNC: 41 U/L (ref 34–104)
ALT SERPL W P-5'-P-CCNC: 23 U/L (ref 7–52)
ANION GAP SERPL CALCULATED.3IONS-SCNC: 6 MMOL/L (ref 4–13)
ANISOCYTOSIS BLD QL SMEAR: PRESENT
AST SERPL W P-5'-P-CCNC: 38 U/L (ref 13–39)
BACTERIA UR QL AUTO: ABNORMAL /HPF
BASOPHILS # BLD AUTO: 0.02 THOUSANDS/ÂΜL (ref 0–0.1)
BASOPHILS NFR BLD AUTO: 0 % (ref 0–1)
BILIRUB SERPL-MCNC: 0.54 MG/DL (ref 0.2–1)
BILIRUB UR QL STRIP: NEGATIVE
BUN SERPL-MCNC: 7 MG/DL (ref 5–25)
BURR CELLS BLD QL SMEAR: PRESENT
CALCIUM SERPL-MCNC: 8.8 MG/DL (ref 8.4–10.2)
CHLORIDE SERPL-SCNC: 102 MMOL/L (ref 96–108)
CLARITY UR: ABNORMAL
CO2 SERPL-SCNC: 26 MMOL/L (ref 21–32)
COLOR UR: YELLOW
CREAT SERPL-MCNC: 0.67 MG/DL (ref 0.6–1.3)
EOSINOPHIL # BLD AUTO: 0.03 THOUSAND/ÂΜL (ref 0–0.61)
EOSINOPHIL NFR BLD AUTO: 1 % (ref 0–6)
ERYTHROCYTE [DISTWIDTH] IN BLOOD BY AUTOMATED COUNT: 12.3 % (ref 11.6–15.1)
EXT PREGNANCY TEST URINE: NEGATIVE
EXT. CONTROL: NORMAL
GFR SERPL CREATININE-BSD FRML MDRD: 115 ML/MIN/1.73SQ M
GLUCOSE SERPL-MCNC: 101 MG/DL (ref 65–140)
GLUCOSE UR STRIP-MCNC: NEGATIVE MG/DL
HCT VFR BLD AUTO: 33.4 % (ref 34.8–46.1)
HGB BLD-MCNC: 11.1 G/DL (ref 11.5–15.4)
HGB UR QL STRIP.AUTO: ABNORMAL
IMM GRANULOCYTES # BLD AUTO: 0.04 THOUSAND/UL (ref 0–0.2)
IMM GRANULOCYTES NFR BLD AUTO: 1 % (ref 0–2)
KETONES UR STRIP-MCNC: NEGATIVE MG/DL
LEUKOCYTE ESTERASE UR QL STRIP: ABNORMAL
LIPASE SERPL-CCNC: 18 U/L (ref 11–82)
LYMPHOCYTES # BLD AUTO: 1.61 THOUSANDS/ÂΜL (ref 0.6–4.47)
LYMPHOCYTES NFR BLD AUTO: 25 % (ref 14–44)
MCH RBC QN AUTO: 31.8 PG (ref 26.8–34.3)
MCHC RBC AUTO-ENTMCNC: 33.2 G/DL (ref 31.4–37.4)
MCV RBC AUTO: 96 FL (ref 82–98)
MONOCYTES # BLD AUTO: 0.68 THOUSAND/ÂΜL (ref 0.17–1.22)
MONOCYTES NFR BLD AUTO: 10 % (ref 4–12)
NEUTROPHILS # BLD AUTO: 4.17 THOUSANDS/ÂΜL (ref 1.85–7.62)
NEUTS SEG NFR BLD AUTO: 63 % (ref 43–75)
NITRITE UR QL STRIP: POSITIVE
NON-SQ EPI CELLS URNS QL MICRO: ABNORMAL /HPF
NRBC BLD AUTO-RTO: 0 /100 WBCS
PH UR STRIP.AUTO: 7 [PH]
PLATELET # BLD AUTO: 94 THOUSANDS/UL (ref 149–390)
PLATELET BLD QL SMEAR: ABNORMAL
PMV BLD AUTO: 10.7 FL (ref 8.9–12.7)
POIKILOCYTOSIS BLD QL SMEAR: PRESENT
POTASSIUM SERPL-SCNC: 4.8 MMOL/L (ref 3.5–5.3)
PROT SERPL-MCNC: 7.5 G/DL (ref 6.4–8.4)
PROT UR STRIP-MCNC: NEGATIVE MG/DL
RBC # BLD AUTO: 3.49 MILLION/UL (ref 3.81–5.12)
RBC #/AREA URNS AUTO: ABNORMAL /HPF
RBC MORPH BLD: PRESENT
S PYO DNA THROAT QL NAA+PROBE: NOT DETECTED
SODIUM SERPL-SCNC: 134 MMOL/L (ref 135–147)
SP GR UR STRIP.AUTO: 1.02 (ref 1–1.03)
UROBILINOGEN UR STRIP-ACNC: 4 MG/DL
WBC # BLD AUTO: 6.55 THOUSAND/UL (ref 4.31–10.16)
WBC #/AREA URNS AUTO: ABNORMAL /HPF

## 2025-05-22 PROCEDURE — 81001 URINALYSIS AUTO W/SCOPE: CPT | Performed by: EMERGENCY MEDICINE

## 2025-05-22 PROCEDURE — 36415 COLL VENOUS BLD VENIPUNCTURE: CPT | Performed by: EMERGENCY MEDICINE

## 2025-05-22 PROCEDURE — 96375 TX/PRO/DX INJ NEW DRUG ADDON: CPT

## 2025-05-22 PROCEDURE — 87086 URINE CULTURE/COLONY COUNT: CPT | Performed by: EMERGENCY MEDICINE

## 2025-05-22 PROCEDURE — 96361 HYDRATE IV INFUSION ADD-ON: CPT

## 2025-05-22 PROCEDURE — 87651 STREP A DNA AMP PROBE: CPT | Performed by: EMERGENCY MEDICINE

## 2025-05-22 PROCEDURE — 85025 COMPLETE CBC W/AUTO DIFF WBC: CPT | Performed by: EMERGENCY MEDICINE

## 2025-05-22 PROCEDURE — 99283 EMERGENCY DEPT VISIT LOW MDM: CPT

## 2025-05-22 PROCEDURE — 83690 ASSAY OF LIPASE: CPT | Performed by: EMERGENCY MEDICINE

## 2025-05-22 PROCEDURE — 80053 COMPREHEN METABOLIC PANEL: CPT | Performed by: EMERGENCY MEDICINE

## 2025-05-22 PROCEDURE — 87077 CULTURE AEROBIC IDENTIFY: CPT | Performed by: EMERGENCY MEDICINE

## 2025-05-22 PROCEDURE — 87186 SC STD MICRODIL/AGAR DIL: CPT | Performed by: EMERGENCY MEDICINE

## 2025-05-22 PROCEDURE — 99284 EMERGENCY DEPT VISIT MOD MDM: CPT | Performed by: EMERGENCY MEDICINE

## 2025-05-22 PROCEDURE — 81025 URINE PREGNANCY TEST: CPT | Performed by: EMERGENCY MEDICINE

## 2025-05-22 PROCEDURE — 96365 THER/PROPH/DIAG IV INF INIT: CPT

## 2025-05-22 RX ORDER — CEPHALEXIN 500 MG/1
500 CAPSULE ORAL EVERY 6 HOURS SCHEDULED
Qty: 28 CAPSULE | Refills: 0 | Status: SHIPPED | OUTPATIENT
Start: 2025-05-22 | End: 2025-05-29

## 2025-05-22 RX ORDER — CEFTRIAXONE 2 G/50ML
2000 INJECTION, SOLUTION INTRAVENOUS ONCE
Status: COMPLETED | OUTPATIENT
Start: 2025-05-22 | End: 2025-05-22

## 2025-05-22 RX ORDER — ACETAMINOPHEN 325 MG/1
975 TABLET ORAL ONCE
Status: COMPLETED | OUTPATIENT
Start: 2025-05-22 | End: 2025-05-22

## 2025-05-22 RX ORDER — ONDANSETRON 2 MG/ML
4 INJECTION INTRAMUSCULAR; INTRAVENOUS ONCE
Status: COMPLETED | OUTPATIENT
Start: 2025-05-22 | End: 2025-05-22

## 2025-05-22 RX ORDER — KETOROLAC TROMETHAMINE 30 MG/ML
15 INJECTION, SOLUTION INTRAMUSCULAR; INTRAVENOUS ONCE
Status: COMPLETED | OUTPATIENT
Start: 2025-05-22 | End: 2025-05-22

## 2025-05-22 RX ADMIN — CEFTRIAXONE 2000 MG: 2 INJECTION, SOLUTION INTRAVENOUS at 22:38

## 2025-05-22 RX ADMIN — KETOROLAC TROMETHAMINE 15 MG: 30 INJECTION, SOLUTION INTRAMUSCULAR at 21:25

## 2025-05-22 RX ADMIN — ONDANSETRON 4 MG: 2 INJECTION INTRAMUSCULAR; INTRAVENOUS at 21:25

## 2025-05-22 RX ADMIN — ACETAMINOPHEN 975 MG: 325 TABLET ORAL at 21:24

## 2025-05-22 RX ADMIN — SODIUM CHLORIDE 1000 ML: 0.9 INJECTION, SOLUTION INTRAVENOUS at 21:25

## 2025-05-23 NOTE — DISCHARGE INSTRUCTIONS
Please take Keflex 4 times a day for 7 days for urinary tract infection.  Please follow-up with your primary care doctor, you should also follow-up with hematology for repeat labs within a few weeks to recheck platelet counts.  I suspect your platelets are low secondary to a viral illness.  If you develop easy bruising or easy bleeding from the nose or mouth, return to the emergency department for further evaluation.

## 2025-05-23 NOTE — ED PROVIDER NOTES
Time reflects when diagnosis was documented in both MDM as applicable and the Disposition within this note       Time User Action Codes Description Comment    5/22/2025 11:01 PM Chula Muir Add [N39.0] UTI (urinary tract infection)     5/22/2025 11:01 PM Chula Muir Add [D69.6] Thrombocytopenia (HCC)     5/22/2025 11:01 PM Chula Muir Add [D64.9] Anemia           ED Disposition       ED Disposition   Discharge    Condition   Stable    Date/Time   Thu May 22, 2025 11:01 PM    Comment   Mecca Romero discharge to home/self care.                   Assessment & Plan       Medical Decision Making  Amount and/or Complexity of Data Reviewed  Labs: ordered.    Risk  OTC drugs.  Prescription drug management.      34-year-old female presenting for evaluation of fevers, along with sore throat, body aches, abdominal pain and nausea/vomiting.  Patient is febrile, vitals otherwise normal.  Patient is overall well-appearing.  I suspect she has a viral syndrome.  Will treat symptomatically with fluids and medications and reassess.  Will check CBC to evaluate for anemia or leukocytosis, CMP to evaluate for metabolic abnormality, lipase to evaluate for pancreatitis, strep swab, and UA to evaluate for UTI.  Reviewed labs, strep swab negative.  UA positive for UTI.  Will treat with ceftriaxone.  She does have some abdominal pain but no flank pain.  But given fever, will treat for complicated UTI.   Reviewed CBC, shows mild anemia and new thrombocytopenia, she does follow with heme-onc for anemia.  I suspect she also has a viral syndrome which may be causing thrombocytopenia but recommend patient follows up with heme-onc regarding this.  Patient does states she feels better.  Will send patient Keflex for 7 days for UTI.  Discussed with patient strict return precautions.  Patient expressed understanding and was agreeable for discharge.         Medications   ketorolac (TORADOL) injection 15 mg (15 mg Intravenous Given 5/22/25  2125)   acetaminophen (TYLENOL) tablet 975 mg (975 mg Oral Given 5/22/25 2124)   sodium chloride 0.9 % bolus 1,000 mL (0 mL Intravenous Stopped 5/22/25 2236)   ondansetron (ZOFRAN) injection 4 mg (4 mg Intravenous Given 5/22/25 2125)   cefTRIAXone (ROCEPHIN) IVPB (premix in dextrose) 2,000 mg 50 mL (0 mg Intravenous Stopped 5/22/25 2313)       ED Risk Strat Scores                    No data recorded                            History of Present Illness       Chief Complaint   Patient presents with    Fever     Fever, vomiting, abdominal pain, body aches x3 days       Past Medical History[1]   Past Surgical History[2]   Family History[3]   Social History[4]   E-Cigarette/Vaping    E-Cigarette Use Never User       E-Cigarette/Vaping Substances      I have reviewed and agree with the history as documented.     HPI    34-year-old female presenting for evaluation of fevers.  Patient states she has been having fevers for the past 2 to 3 days.  She is also having chills.  She states she has had a sore throat.  She denies cough, shortness of breath or chest pain.  She also states she has been having some upper abdominal pain.  She states she has had nausea and vomiting.  Denies diarrhea.  She states her urine has been darker but denies dysuria or hematuria.  Denies leg pain or swelling.  She states she has had generalized myalgias.  Denies any recent sick contacts.  She has not been doing any over-the-counter medications for symptoms.    Review of Systems   Constitutional:  Positive for appetite change, chills and fever.   HENT:  Positive for sore throat. Negative for congestion and rhinorrhea.    Respiratory:  Negative for cough and shortness of breath.    Cardiovascular:  Negative for chest pain.   Gastrointestinal:  Positive for abdominal pain, nausea and vomiting. Negative for constipation and diarrhea.   Genitourinary:  Positive for decreased urine volume. Negative for dysuria, frequency, hematuria and urgency.    Musculoskeletal:  Negative for arthralgias and myalgias.   Skin:  Negative for rash.   Neurological:  Negative for dizziness, weakness, light-headedness, numbness and headaches.   All other systems reviewed and are negative.          Objective       ED Triage Vitals   Temperature Pulse Blood Pressure Respirations SpO2 Patient Position - Orthostatic VS   05/22/25 2055 05/22/25 2055 05/22/25 2055 05/22/25 2055 05/22/25 2055 05/22/25 2100   (!) 101.3 °F (38.5 °C) 78 107/71 18 100 % Lying      Temp Source Heart Rate Source BP Location FiO2 (%) Pain Score    05/22/25 2055 05/22/25 2055 05/22/25 2100 -- 05/22/25 2055    Temporal Monitor Left arm  10 - Worst Possible Pain      Vitals      Date and Time Temp Pulse SpO2 Resp BP Pain Score FACES Pain Rating User   05/22/25 2308 99.1 °F (37.3 °C) 72 97 % 18 102/61 4 --    05/22/25 2242 99.8 °F (37.7 °C) -- -- -- -- -- -- EZ   05/22/25 2144 -- -- -- -- -- 4 -- EZ   05/22/25 2124 -- -- -- -- -- Med Not Given for Pain - for MAR use only -- EZ   05/22/25 2100 101.3 °F (38.5 °C) 82 99 % 18 117/76 4 -- EZ   05/22/25 2055 101.3 °F (38.5 °C) 78 100 % 18 107/71 10 - Worst Possible Pain -- JL            Physical Exam  Vitals and nursing note reviewed.   Constitutional:       General: She is not in acute distress.     Appearance: Normal appearance. She is well-developed and normal weight. She is not ill-appearing, toxic-appearing or diaphoretic.   HENT:      Head: Normocephalic and atraumatic.      Right Ear: External ear normal.      Left Ear: External ear normal.      Nose: Congestion present.      Mouth/Throat:      Mouth: Mucous membranes are moist.      Pharynx: Oropharynx is clear. Posterior oropharyngeal erythema present. No oropharyngeal exudate.     Eyes:      Extraocular Movements: Extraocular movements intact.      Conjunctiva/sclera: Conjunctivae normal.       Cardiovascular:      Rate and Rhythm: Normal rate and regular rhythm.      Pulses: Normal pulses.      Heart  sounds: Normal heart sounds. No murmur heard.     No friction rub. No gallop.   Pulmonary:      Effort: Pulmonary effort is normal. No respiratory distress.      Breath sounds: Normal breath sounds. No wheezing or rales.   Abdominal:      General: There is no distension.      Palpations: Abdomen is soft.      Tenderness: There is abdominal tenderness. There is no guarding or rebound.      Comments: Mild generalized abdominal tenderness.     Musculoskeletal:         General: No tenderness.      Cervical back: Normal range of motion and neck supple. No rigidity.      Right lower leg: No edema.      Left lower leg: No edema.   Lymphadenopathy:      Cervical: Cervical adenopathy present.     Skin:     General: Skin is warm and dry.      Coloration: Skin is not pale.      Findings: No erythema or rash.     Neurological:      General: No focal deficit present.      Mental Status: She is alert and oriented to person, place, and time.      Cranial Nerves: No cranial nerve deficit.      Sensory: No sensory deficit.      Motor: No weakness.     Psychiatric:         Mood and Affect: Mood normal.         Behavior: Behavior normal.         Results Reviewed       Procedure Component Value Units Date/Time    CBC and differential [[id number]]  (Abnormal) Collected: 05/22/25 2201    Lab Status: Final result Specimen: Blood from Arm, Right Updated: 05/22/25 2256     WBC 6.55 Thousand/uL      RBC 3.49 Million/uL      Hemoglobin 11.1 g/dL      Hematocrit 33.4 %      MCV 96 fL      MCH 31.8 pg      MCHC 33.2 g/dL      RDW 12.3 %      MPV 10.7 fL      Platelets 94 Thousands/uL      nRBC 0 /100 WBCs      Segmented % 63 %      Immature Grans % 1 %      Lymphocytes % 25 %      Monocytes % 10 %      Eosinophils Relative 1 %      Basophils Relative 0 %      Absolute Neutrophils 4.17 Thousands/µL      Absolute Immature Grans 0.04 Thousand/uL      Absolute Lymphocytes 1.61 Thousands/µL      Absolute Monocytes 0.68 Thousand/µL      Eosinophils  Absolute 0.03 Thousand/µL      Basophils Absolute 0.02 Thousands/µL     Narrative:      This is an appended report.  These results have been appended to a previously verified report.    Smear Review(Phlebs Do Not Order) [[id number]]  (Abnormal) Collected: 05/22/25 2201    Lab Status: Final result Specimen: Blood from Arm, Right Updated: 05/22/25 2256     RBC Morphology Present     Platelet Estimate Decreased     Anisocytosis Present     Moody Cells Present     Poikilocytes Present    Comprehensive metabolic panel [[id number]]  (Abnormal) Collected: 05/22/25 2126    Lab Status: Final result Specimen: Blood from Arm, Right Updated: 05/22/25 2204     Sodium 134 mmol/L      Potassium 4.8 mmol/L      Chloride 102 mmol/L      CO2 26 mmol/L      ANION GAP 6 mmol/L      BUN 7 mg/dL      Creatinine 0.67 mg/dL      Glucose 101 mg/dL      Calcium 8.8 mg/dL      AST 38 U/L      ALT 23 U/L      Alkaline Phosphatase 41 U/L      Total Protein 7.5 g/dL      Albumin 4.2 g/dL      Total Bilirubin 0.54 mg/dL      eGFR 115 ml/min/1.73sq m     Narrative:      [employer] guidelines for Chronic Kidney Disease (CKD):     Stage 1 with normal or high GFR (GFR > 90 mL/min/1.73 square meters)    Stage 2 Mild CKD (GFR = 60-89 mL/min/1.73 square meters)    Stage 3A Moderate CKD (GFR = 45-59 mL/min/1.73 square meters)    Stage 3B Moderate CKD (GFR = 30-44 mL/min/1.73 square meters)    Stage 4 Severe CKD (GFR = 15-29 mL/min/1.73 square meters)    Stage 5 End Stage CKD (GFR <15 mL/min/1.73 square meters)  Note: GFR calculation is accurate only with a steady state creatinine    Lipase [[id number]]  (Normal) Collected: 05/22/25 2126    Lab Status: Final result Specimen: Blood from Arm, Right Updated: 05/22/25 2204     Lipase 18 u/L     Strep A PCR [[id number]]  (Normal) Collected: 05/22/25 2126    Lab Status: Final result Specimen: Throat Updated: 05/22/25 2158     STREP A PCR Not Detected    Urine Microscopic [[id number]]   (Abnormal) Collected: 05/22/25 2126    Lab Status: Final result Specimen: Urine, Clean Catch Updated: 05/22/25 2155     RBC, UA 2-4 /hpf      WBC, UA 10-20 /hpf      Epithelial Cells Moderate /hpf      Bacteria, UA Innumerable /hpf     Urine culture [[id number]] Collected: 05/22/25 2126    Lab Status: In process Specimen: Urine, Clean Catch Updated: 05/22/25 2155    UA w Reflex to Microscopic w Reflex to Culture [[id number]]  (Abnormal) Collected: 05/22/25 2126    Lab Status: Final result Specimen: Urine, Clean Catch Updated: 05/22/25 2140     Color, UA Yellow     Clarity, UA Cloudy     Specific Gravity, UA 1.020     pH, UA 7.0     Leukocytes, UA Large     Nitrite, UA Positive     Protein, UA Negative mg/dl      Glucose, UA Negative mg/dl      Ketones, UA Negative mg/dl      Urobilinogen, UA 4.0 mg/dl      Bilirubin, UA Negative     Occult Blood, UA Moderate    POCT pregnancy, urine [[id number]]  (Normal) Collected: 05/22/25 2126    Lab Status: Final result Updated: 05/22/25 2127     EXT Preg Test, Ur Negative     Control Valid            No orders to display       Procedures    ED Medication and Procedure Management   Prior to Admission Medications   Prescriptions Last Dose Informant Patient Reported? Taking?   albuterol (2.5 mg/3 mL) 0.083 % nebulizer solution   No No   Sig: Take 3 mL (2.5 mg total) by nebulization every 6 (six) hours as needed for wheezing or shortness of breath   albuterol (Ventolin HFA) 90 mcg/act inhaler   No No   Sig: Inhale 2 puffs every 6 (six) hours as needed for wheezing   citalopram (CeleXA) 20 mg tablet   Yes No   Sig: Take 20 mg by mouth daily   Patient not taking: Reported on 9/29/2023   metoclopramide (REGLAN) 10 mg tablet   No No   Sig: Take 1 tablet (10 mg total) by mouth every 6 (six) hours as needed (nausea/vomiting)   Patient not taking: Reported on 9/29/2023   predniSONE 20 mg tablet   No No   Sig: Take 3 tablets daily for 3 days followed by 2 tablets daily for 3 days followed by  1 tablet daily for 3 days      Facility-Administered Medications: None     Discharge Medication List as of 2025 11:02 PM        START taking these medications    Details   cephalexin (KEFLEX) 500 mg capsule Take 1 capsule (500 mg total) by mouth every 6 (six) hours for 7 days, Starting Thu 2025, Until Thu 2025, Normal           CONTINUE these medications which have NOT CHANGED    Details   albuterol (2.5 mg/3 mL) 0.083 % nebulizer solution Take 3 mL (2.5 mg total) by nebulization every 6 (six) hours as needed for wheezing or shortness of breath, Starting Mon 2024, Normal      albuterol (Ventolin HFA) 90 mcg/act inhaler Inhale 2 puffs every 6 (six) hours as needed for wheezing, Starting Mon 2024, Normal      citalopram (CeleXA) 20 mg tablet Take 20 mg by mouth daily, Historical Med      metoclopramide (REGLAN) 10 mg tablet Take 1 tablet (10 mg total) by mouth every 6 (six) hours as needed (nausea/vomiting), Starting Thu 3/19/2020, Normal      predniSONE 20 mg tablet Take 3 tablets daily for 3 days followed by 2 tablets daily for 3 days followed by 1 tablet daily for 3 days, Normal           No discharge procedures on file.  ED SEPSIS DOCUMENTATION   Time reflects when diagnosis was documented in both MDM as applicable and the Disposition within this note       Time User Action Codes Description Comment    2025 11:01 PM Chula Muir Add [N39.0] UTI (urinary tract infection)     2025 11:01 PM Chula Muir Add [D69.6] Thrombocytopenia (HCC)     2025 11:01 PM Chula Muir Add [D64.9] Anemia                      [1]   Past Medical History:  Diagnosis Date    Bilateral ovarian cysts     Psychiatric disorder     depression   [2] No past surgical history on file.  [3] No family history on file.  [4]   Social History  Tobacco Use    Smoking status: Former     Current packs/day: 0.00     Types: Cigarettes     Quit date: 2016     Years since quittin.9    Smokeless tobacco:  Never   Vaping Use    Vaping status: Never Used   Substance Use Topics    Alcohol use: Yes     Comment: socially    Drug use: Not Currently        Chula Muir MD  05/23/25 0053

## 2025-05-24 ENCOUNTER — RESULTS FOLLOW-UP (OUTPATIENT)
Dept: EMERGENCY DEPT | Facility: HOSPITAL | Age: 35
End: 2025-05-24

## 2025-05-25 LAB — BACTERIA UR CULT: ABNORMAL
